# Patient Record
Sex: FEMALE | Race: WHITE | NOT HISPANIC OR LATINO | Employment: OTHER | URBAN - METROPOLITAN AREA
[De-identification: names, ages, dates, MRNs, and addresses within clinical notes are randomized per-mention and may not be internally consistent; named-entity substitution may affect disease eponyms.]

---

## 2021-03-17 ENCOUNTER — APPOINTMENT (OUTPATIENT)
Dept: WOMENS IMAGING | Facility: HOSPITAL | Age: 71
End: 2021-03-17

## 2021-03-17 PROCEDURE — 77067 SCR MAMMO BI INCL CAD: CPT | Performed by: RADIOLOGY

## 2022-03-29 ENCOUNTER — TRANSCRIBE ORDERS (OUTPATIENT)
Dept: MAMMOGRAPHY | Facility: CLINIC | Age: 72
End: 2022-03-29

## 2022-03-29 DIAGNOSIS — Z12.31 ENCOUNTER FOR SCREENING MAMMOGRAM FOR MALIGNANT NEOPLASM OF BREAST: ICD-10-CM

## 2022-03-29 DIAGNOSIS — M85.89 DISAPPEARING BONE DISEASE: Primary | ICD-10-CM

## 2022-03-29 DIAGNOSIS — Z13.820 SCREENING FOR OSTEOPOROSIS: ICD-10-CM

## 2022-04-13 ENCOUNTER — APPOINTMENT (OUTPATIENT)
Dept: WOMENS IMAGING | Facility: HOSPITAL | Age: 72
End: 2022-04-13

## 2022-04-13 PROCEDURE — 77067 SCR MAMMO BI INCL CAD: CPT | Performed by: RADIOLOGY

## 2022-04-14 ENCOUNTER — OFFICE VISIT (OUTPATIENT)
Dept: FAMILY MEDICINE CLINIC | Facility: CLINIC | Age: 72
End: 2022-04-14

## 2022-04-14 VITALS — BODY MASS INDEX: 44.16 KG/M2 | WEIGHT: 240 LBS | HEIGHT: 62 IN

## 2022-04-14 DIAGNOSIS — Z79.4 TYPE 2 DIABETES MELLITUS WITH STAGE 3A CHRONIC KIDNEY DISEASE, WITH LONG-TERM CURRENT USE OF INSULIN: ICD-10-CM

## 2022-04-14 DIAGNOSIS — Z85.3 HISTORY OF BREAST CANCER: ICD-10-CM

## 2022-04-14 DIAGNOSIS — N18.30 CKD STAGE 3 DUE TO TYPE 2 DIABETES MELLITUS: Primary | ICD-10-CM

## 2022-04-14 DIAGNOSIS — E11.22 TYPE 2 DIABETES MELLITUS WITH STAGE 3A CHRONIC KIDNEY DISEASE, WITH LONG-TERM CURRENT USE OF INSULIN: ICD-10-CM

## 2022-04-14 DIAGNOSIS — M1A.9XX0 CHRONIC GOUT WITHOUT TOPHUS, UNSPECIFIED CAUSE, UNSPECIFIED SITE: ICD-10-CM

## 2022-04-14 DIAGNOSIS — N18.31 TYPE 2 DIABETES MELLITUS WITH STAGE 3A CHRONIC KIDNEY DISEASE, WITH LONG-TERM CURRENT USE OF INSULIN: ICD-10-CM

## 2022-04-14 DIAGNOSIS — E11.22 CKD STAGE 3 DUE TO TYPE 2 DIABETES MELLITUS: Primary | ICD-10-CM

## 2022-04-14 DIAGNOSIS — I10 ESSENTIAL HYPERTENSION: ICD-10-CM

## 2022-04-14 PROBLEM — E66.01 MORBID OBESITY: Status: ACTIVE | Noted: 2022-04-14

## 2022-04-14 PROCEDURE — 36415 COLL VENOUS BLD VENIPUNCTURE: CPT | Performed by: STUDENT IN AN ORGANIZED HEALTH CARE EDUCATION/TRAINING PROGRAM

## 2022-04-14 PROCEDURE — 99214 OFFICE O/P EST MOD 30 MIN: CPT | Performed by: STUDENT IN AN ORGANIZED HEALTH CARE EDUCATION/TRAINING PROGRAM

## 2022-04-14 RX ORDER — ERGOCALCIFEROL 1.25 MG/1
50000 CAPSULE ORAL WEEKLY
Qty: 12 CAPSULE | Refills: 0 | Status: SHIPPED | OUTPATIENT
Start: 2022-04-14 | End: 2022-06-27

## 2022-04-14 RX ORDER — POTASSIUM CHLORIDE 750 MG/1
TABLET, EXTENDED RELEASE ORAL
COMMUNITY
Start: 2022-04-04 | End: 2022-12-12

## 2022-04-14 RX ORDER — FLUOXETINE HYDROCHLORIDE 40 MG/1
1 CAPSULE ORAL EVERY MORNING
COMMUNITY
Start: 2021-10-13 | End: 2022-04-14 | Stop reason: SDUPTHER

## 2022-04-14 RX ORDER — ATENOLOL 100 MG/1
100 TABLET ORAL DAILY
COMMUNITY
End: 2022-04-14 | Stop reason: SDUPTHER

## 2022-04-14 RX ORDER — ALLOPURINOL 100 MG/1
TABLET ORAL
COMMUNITY
Start: 2022-04-04 | End: 2022-07-22

## 2022-04-14 RX ORDER — ATORVASTATIN CALCIUM 80 MG/1
80 TABLET, FILM COATED ORAL DAILY
COMMUNITY
End: 2022-06-27

## 2022-04-14 RX ORDER — ATENOLOL 100 MG/1
100 TABLET ORAL DAILY
Qty: 90 TABLET | Refills: 1 | Status: SHIPPED | OUTPATIENT
Start: 2022-04-14 | End: 2022-08-04

## 2022-04-14 RX ORDER — FLUOXETINE HYDROCHLORIDE 40 MG/1
40 CAPSULE ORAL EVERY MORNING
Qty: 90 CAPSULE | Refills: 1 | Status: SHIPPED | OUTPATIENT
Start: 2022-04-14 | End: 2022-10-20 | Stop reason: SDUPTHER

## 2022-04-14 RX ORDER — ZOLPIDEM TARTRATE 10 MG/1
TABLET ORAL
COMMUNITY
Start: 2022-04-04 | End: 2022-07-14

## 2022-04-14 RX ORDER — TORSEMIDE 20 MG/1
TABLET ORAL
COMMUNITY
Start: 2022-04-04 | End: 2022-07-22

## 2022-04-14 NOTE — PROGRESS NOTES
"Chief Complaint  Med Refill    Subjective          Jyoti Gray presents to [facility]  History of Present Illness    Hypertension: She does not check her blood pressure. She has some headaches, but no chest pain, SOA, She is also taking torsemide for her blood pressure and for swelling. She is unsure of if she has had ECHO associated with this.     Major depression: stable on Fluoxetine and she is not having trouble with this medications.  Denies any SI, HI, thoughts of self-harm.  She states that her mood is doing okay most days.  She needs a refill of the fluoxetine    Type 2 Diabetes: She is not on any oral medciations. She has been on insulin 70/30 for several years, but does not check her blood glucose every day and states that she judges her glucose based on \"how im feeling\". She does not think she is having any low blood glucoses. She is wanting to see if we are able to get a long actin glucose monitor for her like a dexcom or abigail.     Insomnia: She only takes the ambien as needed.  She does not need a refill at this time.    Vitamin D deficiency: She has blood work ordered from her nephrologist, and is needing a refill vitamin D at this time.  He is tolerating this medication without any side effects.    Objective   Vital Signs:   Ht 157.5 cm (62\")   Wt 109 kg (240 lb)   BMI 43.90 kg/m²     Body mass index is 43.9 kg/m².    Review of Systems    Past History:  Medical History: has a past medical history of Anxiety, Breast cancer ([facility]), Chronic gout without tophus, Chronic kidney disease, stage 4 (severe) ([facility]), Cobalamin deficiency, Essential hypertension, Fibromyalgia, GERD without esophagitis, Gout, High risk medication use, History of malignant neoplasm of breast, Hyperglycemia due to type 1 diabetes mellitus (HCC), Kidney failure, Lipoatrophic diabetes (HCC), Mixed hyperlipidemia, Obesity, Primary insomnia, Severe major depression (HCC), Severe obesity (HCC), Type " 1 diabetes mellitus (HCC), and Vitamin D deficiency.   Surgical History: has a past surgical history that includes Colonoscopy (03/2016) and Breast lumpectomy (Right, 2006).   Family History: family history includes Diabetes in her father and mother; Hyperlipidemia in her mother; Hypertension in her mother; Lung cancer in her brother and father; Lung cancer (age of onset: 86) in her mother; Obesity in her mother.   Social History: reports that she has never smoked. She has never used smokeless tobacco. Drug use questions deferred to the physician. She reports that she does not drink alcohol.      Current Outpatient Medications:   •  allopurinol (ZYLOPRIM) 100 MG tablet, , Disp: , Rfl:   •  atenolol (TENORMIN) 100 MG tablet, Take 1 tablet by mouth Daily., Disp: 90 tablet, Rfl: 1  •  atorvastatin (LIPITOR) 80 MG tablet, Take 80 mg by mouth Daily., Disp: , Rfl:   •  FLUoxetine (PROzac) 40 MG capsule, Take 1 capsule by mouth Every Morning., Disp: 90 capsule, Rfl: 1  •  insulin NPH-insulin regular (humuLIN 70/30,novoLIN 70/30) (70-30) 100 UNIT/ML injection, Inject 100 Units under the skin into the appropriate area as directed 2 (Two) Times a Day With Meals. 45 units in the morning 25 units in the evening., Disp: , Rfl:   •  KLOR-CON 10 MEQ CR tablet, , Disp: , Rfl:   •  torsemide (DEMADEX) 20 MG tablet, , Disp: , Rfl:   •  zolpidem (AMBIEN) 10 MG tablet, , Disp: , Rfl:   •  vitamin D (ERGOCALCIFEROL) 1.25 MG (36026 UT) capsule capsule, Take 1 capsule by mouth 1 (One) Time Per Week., Disp: 12 capsule, Rfl: 0    Allergies: Patient has no known allergies.    Physical Exam  Constitutional:       General: She is not in acute distress.     Appearance: Normal appearance. She is obese. She is not toxic-appearing.   HENT:      Head: Normocephalic and atraumatic.   Cardiovascular:      Rate and Rhythm: Normal rate and regular rhythm.      Heart sounds: No murmur heard.    No gallop.   Pulmonary:      Effort: Pulmonary effort is  normal.      Comments: Mildly decreased breath sounds bilaterally.  No wheezing, rales or rhonchi  Abdominal:      General: There is no distension.      Palpations: Abdomen is soft.      Tenderness: There is no abdominal tenderness.   Neurological:      General: No focal deficit present.      Mental Status: She is alert and oriented to person, place, and time.   Psychiatric:      Comments: Flat mood, normal affect          Result Review :                   Assessment and Plan    Diagnoses and all orders for this visit:    1. CKD stage 3 due to type 2 diabetes mellitus (HCC) (Primary)  Assessment & Plan:  He sees nephrology and has blood work from them which we put in here and reviewed prior to sending to nephrology.  She does not manage her diabetes to the best extent after discussion with the patient.  Plan to send to endocrinology for further management.  Advised her to start taking her blood sugar every day twice a day because we will not be able to get a Dexcom set up for her unless she is having labile blood sugars, and since she is taking insulin it is good for her to check her blood glucose at least twice a day prior to giving herself her insulin.     Orders:  -     Ambulatory Referral to Endocrinology  -     CBC & Differential; Future  -     Comprehensive Metabolic Panel; Future  -     Hemoglobin A1c; Future  -     Lipid Panel; Future  -     Uric acid; Future  -     PTH, Intact; Future  -     Microalbumin / Creatinine Urine Ratio - Urine, Clean Catch; Future  -     Vitamin D 25 hydroxy; Future  -     Urinalysis With Microscopic If Indicated (No Culture) - Urine, Clean Catch; Future  -     CBC & Differential  -     Comprehensive Metabolic Panel  -     Hemoglobin A1c  -     Lipid Panel  -     Uric acid  -     PTH, Intact  -     Vitamin D 25 hydroxy    2. History of breast cancer  -     Ambulatory Referral to Endocrinology  -     CBC & Differential; Future  -     Comprehensive Metabolic Panel; Future  -      Hemoglobin A1c; Future  -     Lipid Panel; Future  -     Uric acid; Future  -     PTH, Intact; Future  -     Microalbumin / Creatinine Urine Ratio - Urine, Clean Catch; Future  -     Vitamin D 25 hydroxy; Future  -     Urinalysis With Microscopic If Indicated (No Culture) - Urine, Clean Catch; Future  -     CBC & Differential  -     Comprehensive Metabolic Panel  -     Hemoglobin A1c  -     Lipid Panel  -     Uric acid  -     PTH, Intact  -     Vitamin D 25 hydroxy    3. Type 2 diabetes mellitus with stage 3a chronic kidney disease, with long-term current use of insulin ([facility])  -     Ambulatory Referral to Endocrinology  -     CBC & Differential; Future  -     Comprehensive Metabolic Panel; Future  -     Hemoglobin A1c; Future  -     Lipid Panel; Future  -     Uric acid; Future  -     PTH, Intact; Future  -     Microalbumin / Creatinine Urine Ratio - Urine, Clean Catch; Future  -     Vitamin D 25 hydroxy; Future  -     Urinalysis With Microscopic If Indicated (No Culture) - Urine, Clean Catch; Future  -     CBC & Differential  -     Comprehensive Metabolic Panel  -     Hemoglobin A1c  -     Lipid Panel  -     Uric acid  -     PTH, Intact  -     Vitamin D 25 hydroxy    4. Essential hypertension  Assessment & Plan:  Stable at this time.  Tolerating atenolol, but needs a refill at this time    Orders:  -     Ambulatory Referral to Endocrinology  -     CBC & Differential; Future  -     Comprehensive Metabolic Panel; Future  -     Hemoglobin A1c; Future  -     Lipid Panel; Future  -     Uric acid; Future  -     PTH, Intact; Future  -     Microalbumin / Creatinine Urine Ratio - Urine, Clean Catch; Future  -     Vitamin D 25 hydroxy; Future  -     Urinalysis With Microscopic If Indicated (No Culture) - Urine, Clean Catch; Future  -     CBC & Differential  -     Comprehensive Metabolic Panel  -     Hemoglobin A1c  -     Lipid Panel  -     Uric acid  -     PTH, Intact  -     Vitamin D 25 hydroxy    5. Chronic gout without  tophus, unspecified cause, unspecified site  Assessment & Plan:  Blood work ordered today.  She is currently taking allopurinol and denies any gout flares recently.    Orders:  -     Ambulatory Referral to Endocrinology  -     CBC & Differential; Future  -     Comprehensive Metabolic Panel; Future  -     Hemoglobin A1c; Future  -     Lipid Panel; Future  -     Uric acid; Future  -     PTH, Intact; Future  -     Microalbumin / Creatinine Urine Ratio - Urine, Clean Catch; Future  -     Vitamin D 25 hydroxy; Future  -     Urinalysis With Microscopic If Indicated (No Culture) - Urine, Clean Catch; Future  -     CBC & Differential  -     Comprehensive Metabolic Panel  -     Hemoglobin A1c  -     Lipid Panel  -     Uric acid  -     PTH, Intact  -     Vitamin D 25 hydroxy    Other orders  -     atenolol (TENORMIN) 100 MG tablet; Take 1 tablet by mouth Daily.  Dispense: 90 tablet; Refill: 1  -     FLUoxetine (PROzac) 40 MG capsule; Take 1 capsule by mouth Every Morning.  Dispense: 90 capsule; Refill: 1  -     vitamin D (ERGOCALCIFEROL) 1.25 MG (80472 UT) capsule capsule; Take 1 capsule by mouth 1 (One) Time Per Week.  Dispense: 12 capsule; Refill: 0      Follow Up   Return in about 3 months (around 7/14/2022).  Patient was given instructions and counseling regarding her condition or for health maintenance advice. Please see specific information pulled into the AVS if appropriate.     Isa Anand, DO

## 2022-04-14 NOTE — ASSESSMENT & PLAN NOTE
He sees nephrology and has blood work from them which we put in here and reviewed prior to sending to nephrology.  She does not manage her diabetes to the best extent after discussion with the patient.  Plan to send to endocrinology for further management.  Advised her to start taking her blood sugar every day twice a day because we will not be able to get a Dexcom set up for her unless she is having labile blood sugars, and since she is taking insulin it is good for her to check her blood glucose at least twice a day prior to giving herself her insulin.

## 2022-04-14 NOTE — ASSESSMENT & PLAN NOTE
Blood work ordered today.  She is currently taking allopurinol and denies any gout flares recently.

## 2022-04-15 LAB
25(OH)D3+25(OH)D2 SERPL-MCNC: 35.7 NG/ML (ref 30–100)
ALBUMIN SERPL-MCNC: 4.2 G/DL (ref 3.7–4.7)
ALBUMIN/GLOB SERPL: 1.4 {RATIO} (ref 1.2–2.2)
ALP SERPL-CCNC: 119 IU/L (ref 44–121)
ALT SERPL-CCNC: 9 IU/L (ref 0–32)
AST SERPL-CCNC: 17 IU/L (ref 0–40)
BASOPHILS # BLD AUTO: 0.1 X10E3/UL (ref 0–0.2)
BASOPHILS NFR BLD AUTO: 1 %
BILIRUB SERPL-MCNC: 1.5 MG/DL (ref 0–1.2)
BUN SERPL-MCNC: 22 MG/DL (ref 8–27)
BUN/CREAT SERPL: 15 (ref 12–28)
CALCIUM SERPL-MCNC: 9.9 MG/DL (ref 8.7–10.3)
CHLORIDE SERPL-SCNC: 101 MMOL/L (ref 96–106)
CHOLEST SERPL-MCNC: 124 MG/DL (ref 100–199)
CO2 SERPL-SCNC: 19 MMOL/L (ref 20–29)
CREAT SERPL-MCNC: 1.44 MG/DL (ref 0.57–1)
EGFRCR SERPLBLD CKD-EPI 2021: 39 ML/MIN/1.73
EOSINOPHIL # BLD AUTO: 0.4 X10E3/UL (ref 0–0.4)
EOSINOPHIL NFR BLD AUTO: 5 %
ERYTHROCYTE [DISTWIDTH] IN BLOOD BY AUTOMATED COUNT: 13.1 % (ref 11.7–15.4)
GLOBULIN SER CALC-MCNC: 3 G/DL (ref 1.5–4.5)
GLUCOSE SERPL-MCNC: 379 MG/DL (ref 65–99)
HBA1C MFR BLD: 10.5 % (ref 4.8–5.6)
HCT VFR BLD AUTO: 40 % (ref 34–46.6)
HDLC SERPL-MCNC: 51 MG/DL
HGB BLD-MCNC: 12.7 G/DL (ref 11.1–15.9)
IMM GRANULOCYTES # BLD AUTO: 0 X10E3/UL (ref 0–0.1)
IMM GRANULOCYTES NFR BLD AUTO: 0 %
LDLC SERPL CALC-MCNC: 50 MG/DL (ref 0–99)
LYMPHOCYTES # BLD AUTO: 2.2 X10E3/UL (ref 0.7–3.1)
LYMPHOCYTES NFR BLD AUTO: 26 %
MCH RBC QN AUTO: 31.1 PG (ref 26.6–33)
MCHC RBC AUTO-ENTMCNC: 31.8 G/DL (ref 31.5–35.7)
MCV RBC AUTO: 98 FL (ref 79–97)
MONOCYTES # BLD AUTO: 0.6 X10E3/UL (ref 0.1–0.9)
MONOCYTES NFR BLD AUTO: 7 %
NEUTROPHILS # BLD AUTO: 5.2 X10E3/UL (ref 1.4–7)
NEUTROPHILS NFR BLD AUTO: 61 %
PLATELET # BLD AUTO: 226 X10E3/UL (ref 150–450)
POTASSIUM SERPL-SCNC: 4.8 MMOL/L (ref 3.5–5.2)
PROT SERPL-MCNC: 7.2 G/DL (ref 6–8.5)
PTH-INTACT SERPL-MCNC: 32 PG/ML (ref 15–65)
RBC # BLD AUTO: 4.09 X10E6/UL (ref 3.77–5.28)
SODIUM SERPL-SCNC: 138 MMOL/L (ref 134–144)
TRIGL SERPL-MCNC: 131 MG/DL (ref 0–149)
URATE SERPL-MCNC: 5.7 MG/DL (ref 3.1–7.9)
VLDLC SERPL CALC-MCNC: 23 MG/DL (ref 5–40)
WBC # BLD AUTO: 8.6 X10E3/UL (ref 3.4–10.8)

## 2022-06-27 ENCOUNTER — OFFICE VISIT (OUTPATIENT)
Dept: ENDOCRINOLOGY | Facility: CLINIC | Age: 72
End: 2022-06-27

## 2022-06-27 VITALS
WEIGHT: 232 LBS | HEART RATE: 70 BPM | OXYGEN SATURATION: 95 % | DIASTOLIC BLOOD PRESSURE: 64 MMHG | HEIGHT: 62 IN | BODY MASS INDEX: 42.69 KG/M2 | SYSTOLIC BLOOD PRESSURE: 128 MMHG

## 2022-06-27 DIAGNOSIS — E11.65 TYPE 2 DIABETES MELLITUS WITH HYPERGLYCEMIA, UNSPECIFIED WHETHER LONG TERM INSULIN USE: Primary | ICD-10-CM

## 2022-06-27 LAB
EXPIRATION DATE: ABNORMAL
GLUCOSE BLDC GLUCOMTR-MCNC: 166 MG/DL (ref 70–130)
Lab: ABNORMAL

## 2022-06-27 PROCEDURE — 99204 OFFICE O/P NEW MOD 45 MIN: CPT | Performed by: INTERNAL MEDICINE

## 2022-06-27 PROCEDURE — 82947 ASSAY GLUCOSE BLOOD QUANT: CPT | Performed by: INTERNAL MEDICINE

## 2022-06-27 RX ORDER — PROCHLORPERAZINE 25 MG/1
1 SUPPOSITORY RECTAL CONTINUOUS
Qty: 1 EACH | Refills: 0 | Status: SHIPPED | OUTPATIENT
Start: 2022-06-27

## 2022-06-27 RX ORDER — PROCHLORPERAZINE 25 MG/1
SUPPOSITORY RECTAL
Qty: 3 EACH | Refills: 11 | Status: SHIPPED | OUTPATIENT
Start: 2022-06-27

## 2022-06-27 RX ORDER — PROCHLORPERAZINE 25 MG/1
1 SUPPOSITORY RECTAL CONTINUOUS
Qty: 1 EACH | Refills: 0 | Status: CANCELLED | OUTPATIENT
Start: 2022-06-27

## 2022-06-27 RX ORDER — PROCHLORPERAZINE 25 MG/1
1 SUPPOSITORY RECTAL
Qty: 1 EACH | Refills: 3 | Status: SHIPPED | OUTPATIENT
Start: 2022-06-27

## 2022-06-27 RX ORDER — PRAVASTATIN SODIUM 80 MG/1
80 TABLET ORAL DAILY
COMMUNITY
End: 2022-12-12

## 2022-06-27 RX ORDER — PROCHLORPERAZINE 25 MG/1
1 SUPPOSITORY RECTAL
Qty: 1 EACH | Refills: 3 | Status: CANCELLED | OUTPATIENT
Start: 2022-06-27

## 2022-06-27 RX ORDER — LOSARTAN POTASSIUM 25 MG/1
12.5 TABLET ORAL DAILY
COMMUNITY

## 2022-06-27 NOTE — PROGRESS NOTES
Chief Complaint   Patient presents with   • Diabetes        New patient who is being seen in consultation regarding uncontrolled diabetes at the request of Isa Anand DO HPI   Jyoti Gray is a 71 y.o. female who presents for evaluation of diabetes    Patient has a history of type 2 diabetes.  This was initially diagnosed 30 or more years ago. She reports she has been on insulin for approximately 10 years. Patient is currently prescribed 45 units in the morning and 25 units at night.  Recommended doses were last changed more than a year ago.  However, patient is not taking medication regularly.  She reports that she frequently holds morning dose if glucose less than 160, she estimates she is only taking this once in the past week.  She does routinely take evening dose as evening glucose tends to be much higher.  Of note, she is also taking insulin postmeal.      Patient reports hypoglycemia awareness with symptoms of shaking and sweating.  She reports having glucose values in the 40s last night after she took both doses of insulin yesterday  Patient monitors blood glucose 1-3 times daily.  Patient presents with handwritten log of values ranging  in the past 2 weeks.  Glucose values generally best in the morning, highest at bedtime.  Patient does not follow a diabetic diet.  Patient does not exercise regularly.     History of dyslipidemia: Yes  History of renal dysfunction: Yes  History of Hypertension: Yes      Past Medical History:   Diagnosis Date   • Anxiety    • Breast cancer (HCC)    • Chronic gout without tophus    • Chronic kidney disease, stage 4 (severe) (HCC)    • Cobalamin deficiency    • Essential hypertension    • Fibromyalgia    • GERD without esophagitis    • Gout    • High risk medication use    • History of malignant neoplasm of breast    • Hyperglycemia due to type 1 diabetes mellitus (HCC)    • Kidney failure    • Lipoatrophic diabetes (HCC)    • Mixed hyperlipidemia    •  Obesity    • Primary insomnia    • Severe major depression (HCC)    • Severe obesity (HCC)    • Type 1 diabetes mellitus (HCC)    • Vitamin D deficiency      Past Surgical History:   Procedure Laterality Date   • ANKLE SURGERY Right    • ARTERIOVENOUS FISTULA     • BREAST LUMPECTOMY Right 2006    Malignant   • COLONOSCOPY  03/2016    NORMAL- HP POLYPS - REPEAT 10 YEARS   • COLONOSCOPY     • NECK SURGERY      to remove port   • SKIN CANCER EXCISION      arm and leg   • TUBAL ABDOMINAL LIGATION        Family History   Problem Relation Age of Onset   • Lung cancer Mother 86   • Diabetes Mother    • Hyperlipidemia Mother    • Hypertension Mother    • Obesity Mother    • Heart disease Mother    • Heart attack Mother    • Diabetes Father    • Lung cancer Father    • Stroke Sister    • No Known Problems Sister    • No Known Problems Sister    • No Known Problems Sister    • Cancer Brother    • Lung cancer Brother    • No Known Problems Son       Social History     Socioeconomic History   • Marital status:    Tobacco Use   • Smoking status: Never Smoker   • Smokeless tobacco: Never Used   Vaping Use   • Vaping Use: Never used   Substance and Sexual Activity   • Alcohol use: Never   • Drug use: Defer   • Sexual activity: Defer      No Known Allergies   Current Outpatient Medications on File Prior to Visit   Medication Sig Dispense Refill   • allopurinol (ZYLOPRIM) 100 MG tablet      • atenolol (TENORMIN) 100 MG tablet Take 1 tablet by mouth Daily. 90 tablet 1   • FLUoxetine (PROzac) 40 MG capsule Take 1 capsule by mouth Every Morning. 90 capsule 1   • KLOR-CON 10 MEQ CR tablet      • losartan (COZAAR) 25 MG tablet Take 25 mg by mouth Daily.     • pravastatin (PRAVACHOL) 80 MG tablet Take 80 mg by mouth Daily.     • torsemide (DEMADEX) 20 MG tablet      • zolpidem (AMBIEN) 10 MG tablet      • [DISCONTINUED] insulin NPH-insulin regular (humuLIN 70/30,novoLIN 70/30) (70-30) 100 UNIT/ML injection Inject 100 Units under  "the skin into the appropriate area as directed 2 (Two) Times a Day With Meals. 45 units in the morning 25 units in the evening.     • [DISCONTINUED] atorvastatin (LIPITOR) 80 MG tablet Take 80 mg by mouth Daily.     • [DISCONTINUED] vitamin D (ERGOCALCIFEROL) 1.25 MG (07667 UT) capsule capsule Take 1 capsule by mouth 1 (One) Time Per Week. 12 capsule 0     No current facility-administered medications on file prior to visit.        Review of Systems   Constitutional: Positive for fatigue. Negative for unexpected weight loss.   HENT: Positive for ear pain, facial swelling and hearing loss.    Eyes: Positive for pain and itching.   Respiratory: Positive for shortness of breath. Negative for cough.    Cardiovascular: Positive for leg swelling. Negative for palpitations.   Gastrointestinal: Positive for diarrhea. Negative for constipation.   Endocrine: Negative for polydipsia and polyuria.   Genitourinary: Positive for decreased urine volume, difficulty urinating and urgency.   Musculoskeletal: Positive for arthralgias, back pain, gait problem, myalgias and neck pain.   Skin: Positive for rash. Negative for dry skin.   Neurological: Positive for dizziness, tremors, weakness, light-headedness and headache.   Psychiatric/Behavioral: Positive for sleep disturbance. The patient is nervous/anxious.         Vitals:    06/27/22 1417   BP: 128/64   BP Location: Left arm   Patient Position: Sitting   Cuff Size: Adult   Pulse: 70   SpO2: 95%   Weight: 105 kg (232 lb)   Height: 157.5 cm (62\")   Body mass index is 42.43 kg/m².     Physical Exam  Vitals reviewed.   Constitutional:       General: She is not in acute distress.     Appearance: She is obese.   HENT:      Head: Normocephalic and atraumatic.      Right Ear: Hearing normal.      Left Ear: Hearing normal.      Nose: Nose normal.   Eyes:      General: Lids are normal.      Conjunctiva/sclera: Conjunctivae normal.   Neck:      Thyroid: No thyromegaly or thyroid tenderness. "   Cardiovascular:      Rate and Rhythm: Normal rate and regular rhythm.      Heart sounds: No murmur heard.  Pulmonary:      Effort: Pulmonary effort is normal.      Breath sounds: Normal breath sounds.   Abdominal:      General: Abdomen is protuberant. Bowel sounds are normal.      Palpations: Abdomen is soft.   Lymphadenopathy:      Cervical: No cervical adenopathy.   Skin:     General: Skin is warm and dry.      Findings: No rash.   Neurological:      Mental Status: She is alert.   Psychiatric:         Mood and Affect: Mood and affect normal.        Labs/Imaging   Latest Reference Range & Units 04/14/22 11:31 06/27/22 14:43   Glucose 70 - 130 mg/dL 379 (H) 166 !   Sodium 134 - 144 mmol/L 138    Potassium 3.5 - 5.2 mmol/L 4.8    CO2 20 - 29 mmol/L 19 (L)    Chloride 96 - 106 mmol/L 101    Creatinine 0.57 - 1.00 mg/dL 1.44 (H)    BUN 8 - 27 mg/dL 22    BUN/Creatinine Ratio 12 - 28  15    Calcium 8.7 - 10.3 mg/dL 9.9    EGFR Result >59 mL/min/1.73 39 (L)    Alkaline Phosphatase 44 - 121 IU/L 119    Total Protein 6.0 - 8.5 g/dL 7.2    Uric Acid 3.1 - 7.9 mg/dL 5.7 [1]    ALT (SGPT) 0 - 32 IU/L 9    AST (SGOT) 0 - 40 IU/L 17    Total Bilirubin 0.0 - 1.2 mg/dL 1.5 (H)    Albumin 3.7 - 4.7 g/dL 4.2    A/G Ratio 1.2 - 2.2  1.4    Hemoglobin A1C 4.8 - 5.6 % 10.5 (H) [2]    PTH Intact (Serial Monitor) 15 - 65 pg/mL 32    Total Cholesterol 100 - 199 mg/dL 124    HDL Cholesterol >39 mg/dL 51    LDL Cholesterol  0 - 99 mg/dL 50    Triglycerides 0 - 149 mg/dL 131    VLDL Cholesterol [place] 5 - 40 mg/dL 23    (H): Data is abnormally high  !: Data is abnormal  (L): Data is abnormally low  [1]            Therapeutic target for gout patients: <6.0  [2]          Prediabetes: 5.7 - 6.4            Diabetes: >6.4            Glycemic control for adults with diabetes: <7.0    Assessment and Plan    Diagnoses and all orders for this visit:    1. Type 2 diabetes mellitus with hyperglycemia, unspecified whether long term insulin use (HCC)  (Primary)  -Uncontrolled with most recent hemoglobin A1c 10.5%, too soon to repeat today  -Patient has a wide amount of variation in glucose values with both hypoglycemia and hyperglycemia, likely due to help doses of medication.  Discussed with patient that it is difficult to make changes to her medication regimen if I do not know exactly what she is taking if she is not taking medication regularly.  Discussed appropriate use of 70/30, reviewed that this should be taken prior to meals and the importance of eating with each dose.  Discussed that help doses of medication will lead to hyperglycemia later in the day.  -Given hypoglycemia reported overnight when patient took both doses of insulin, will plan to reduce recommended total daily dose by approximately 20%  -Recommended patient take 70/30 35 units in the morning and 20 units with dinner.  Discussed that she may reduce dose by half if Premeal glucose less than 150, patient instructed to hold dose if Premeal glucose less than 80.  Patient instructed to keep log of glucose data and insulin administration and to bring this to future appointments for review.  -Patient was advised to monitor blood sugar 2 times daily.  Patient inquired about CGM use given limited mobility.  We will send Dexcom to pharmacy, discussed that insurance coverage is variable.  Patient was instructed to bring glucometer to all future appointments. Patient should contact the clinic between appointments with hypoglycemia or persistent hyperglycemia.  Discussed signs and symptoms of hypoglycemia as well as hypoglycemia management via the rule of 15's.  Discussed potential for long-term complications with uncontrolled diabetes including nephropathy, neuropathy, retinopathy, increased risk for cardiac disease.  Discussed the role of diet and exercise in the management of diabetes.  CMP, lipids up-to-date from April 2022, patient taking pravastatin  -     POC Glucose, Blood    Other orders  -      insulin NPH-insulin regular (humuLIN 70/30,novoLIN 70/30) (70-30) 100 UNIT/ML injection; 35 units in the morning 20 units in the evening.  Dispense: 60 mL; Refill: 1  -     Continuous Blood Gluc Transmit (Dexcom G6 Transmitter) misc; 1 each Every 3 (Three) Months.  Dispense: 1 each; Refill: 3  -     Continuous Blood Gluc  (Dexcom G6 ) device; 1 each Continuous.  Dispense: 1 each; Refill: 0  -     Continuous Blood Gluc Sensor (Dexcom G6 Sensor); Every 10 (Ten) Days.  Dispense: 3 each; Refill: 11      Return in about 6 weeks (around 8/8/2022) for Next scheduled follow up. The patient was instructed to contact the clinic with any interval questions or concerns.    Megan Baez MD     Dictated Utilizing Dragon Dictation

## 2022-07-14 ENCOUNTER — OFFICE VISIT (OUTPATIENT)
Dept: FAMILY MEDICINE CLINIC | Facility: CLINIC | Age: 72
End: 2022-07-14

## 2022-07-14 VITALS
BODY MASS INDEX: 42.14 KG/M2 | HEIGHT: 62 IN | SYSTOLIC BLOOD PRESSURE: 120 MMHG | WEIGHT: 229 LBS | DIASTOLIC BLOOD PRESSURE: 82 MMHG

## 2022-07-14 VITALS
WEIGHT: 229 LBS | DIASTOLIC BLOOD PRESSURE: 82 MMHG | HEIGHT: 62 IN | BODY MASS INDEX: 42.14 KG/M2 | SYSTOLIC BLOOD PRESSURE: 120 MMHG

## 2022-07-14 DIAGNOSIS — E78.2 MIXED HYPERLIPIDEMIA: ICD-10-CM

## 2022-07-14 DIAGNOSIS — Z23 IMMUNIZATION DUE: ICD-10-CM

## 2022-07-14 DIAGNOSIS — N18.31 TYPE 2 DIABETES MELLITUS WITH STAGE 3A CHRONIC KIDNEY DISEASE, WITH LONG-TERM CURRENT USE OF INSULIN: Primary | ICD-10-CM

## 2022-07-14 DIAGNOSIS — Z00.00 ENCOUNTER FOR SUBSEQUENT ANNUAL WELLNESS VISIT (AWV) IN MEDICARE PATIENT: ICD-10-CM

## 2022-07-14 DIAGNOSIS — E11.22 TYPE 2 DIABETES MELLITUS WITH STAGE 3A CHRONIC KIDNEY DISEASE, WITH LONG-TERM CURRENT USE OF INSULIN: Primary | ICD-10-CM

## 2022-07-14 DIAGNOSIS — N18.30 CKD STAGE 3 DUE TO TYPE 2 DIABETES MELLITUS: ICD-10-CM

## 2022-07-14 DIAGNOSIS — Z85.3 HISTORY OF BREAST CANCER: ICD-10-CM

## 2022-07-14 DIAGNOSIS — E11.22 CKD STAGE 3 DUE TO TYPE 2 DIABETES MELLITUS: ICD-10-CM

## 2022-07-14 DIAGNOSIS — M1A.00X0 IDIOPATHIC CHRONIC GOUT WITHOUT TOPHUS, UNSPECIFIED SITE: ICD-10-CM

## 2022-07-14 DIAGNOSIS — I10 ESSENTIAL HYPERTENSION: ICD-10-CM

## 2022-07-14 DIAGNOSIS — Z79.4 TYPE 2 DIABETES MELLITUS WITH STAGE 3A CHRONIC KIDNEY DISEASE, WITH LONG-TERM CURRENT USE OF INSULIN: Primary | ICD-10-CM

## 2022-07-14 DIAGNOSIS — Z78.9 PATIENT HAD NO FALLS IN PAST YEAR: ICD-10-CM

## 2022-07-14 DIAGNOSIS — K21.9 GASTROESOPHAGEAL REFLUX DISEASE WITHOUT ESOPHAGITIS: ICD-10-CM

## 2022-07-14 PROCEDURE — 90677 PCV20 VACCINE IM: CPT | Performed by: STUDENT IN AN ORGANIZED HEALTH CARE EDUCATION/TRAINING PROGRAM

## 2022-07-14 PROCEDURE — 1159F MED LIST DOCD IN RCRD: CPT | Performed by: STUDENT IN AN ORGANIZED HEALTH CARE EDUCATION/TRAINING PROGRAM

## 2022-07-14 PROCEDURE — 1170F FXNL STATUS ASSESSED: CPT | Performed by: STUDENT IN AN ORGANIZED HEALTH CARE EDUCATION/TRAINING PROGRAM

## 2022-07-14 PROCEDURE — G0009 ADMIN PNEUMOCOCCAL VACCINE: HCPCS | Performed by: STUDENT IN AN ORGANIZED HEALTH CARE EDUCATION/TRAINING PROGRAM

## 2022-07-14 PROCEDURE — G0439 PPPS, SUBSEQ VISIT: HCPCS | Performed by: STUDENT IN AN ORGANIZED HEALTH CARE EDUCATION/TRAINING PROGRAM

## 2022-07-14 RX ORDER — ATORVASTATIN CALCIUM 80 MG/1
80 TABLET, FILM COATED ORAL DAILY
COMMUNITY
End: 2022-12-12 | Stop reason: SDUPTHER

## 2022-07-14 NOTE — PROGRESS NOTES
QUICK REFERENCE INFORMATION:  The ABCs of the Annual Wellness Visit    Subsequent Medicare Wellness Visit      HEALTH RISK ASSESSMENT    1950    Recent Hospitalizations:  No hospitalization(s) within the last year..    Current Medical Providers:  Patient Care Team:  Cooper Aaron MD as PCP - General (Family Medicine)  Juan Manuel De Los Santos MD as Consulting Physician (Family Medicine)  Megan Baez MD as Consulting Physician (Endocrinology)    Smoking Status:  Social History     Tobacco Use   Smoking Status Never Smoker   Smokeless Tobacco Never Used       Alcohol Consumption:  Social History     Substance and Sexual Activity   Alcohol Use Never       Depression Screen:   PHQ-2/PHQ-9 Depression Screening 7/14/2022   Little Interest or Pleasure in Doing Things 0-->not at all   Feeling Down, Depressed or Hopeless 0-->not at all   PHQ-9: Brief Depression Severity Measure Score 0       Health Habits and Functional and Cognitive Screening:  Functional & Cognitive Status 7/14/2022   Do you have difficulty preparing food and eating? No   Do you have difficulty bathing yourself, getting dressed or grooming yourself? Yes   Do you have difficulty using the toilet? No   Do you have difficulty moving around from place to place? No   Do you have trouble with steps or getting out of a bed or a chair? No   Current Diet Diabetic Diet   Dental Exam Not up to date   Eye Exam Not up to date   Exercise (times per week) 0 times per week   Current Exercises Include No Regular Exercise   Do you need help using the phone?  No   Are you deaf or do you have serious difficulty hearing?  No   Do you need help with transportation? No   Do you need help shopping? Yes   Do you need help preparing meals?  No   Do you need help with housework?  No   Do you need help with laundry? No   Do you need help taking your medications? No   Do you need help managing money? No   Do you ever drive or ride in a car without wearing a seat belt? No   Have you  felt unusual stress, anger or loneliness in the last month? No   Who do you live with? Child   If you need help, do you have trouble finding someone available to you? No   Have you been bothered in the last four weeks by sexual problems? No   Do you have difficulty concentrating, remembering or making decisions? No       Fall Risk Screen:  AYUSH Fall Risk Assessment was completed, and patient is at LOW risk for falls.Assessment completed on:7/14/2022    ACE III MINI        Does the patient have evidence of cognitive impairment? No    No opioid medication identified on active medication list. I have reviewed chart for other potential  high risk medication/s and harmful drug interactions in the elderly.          Aspirin use counseling: Does not need ASA (and currently is not on it)    Recent Lab Results:  CMP:  Lab Results   Component Value Date    BUN 22 04/14/2022    CREATININE 1.44 (H) 04/14/2022    BCR 15 04/14/2022     04/14/2022    K 4.8 04/14/2022    CO2 19 (L) 04/14/2022    CALCIUM 9.9 04/14/2022    PROTENTOTREF 7.2 04/14/2022    ALBUMIN 4.2 04/14/2022    LABGLOBREF 3.0 04/14/2022    LABIL2 1.4 04/14/2022    BILITOT 1.5 (H) 04/14/2022    ALKPHOS 119 04/14/2022    AST 17 04/14/2022    ALT 9 04/14/2022     HbA1c:  Lab Results   Component Value Date    HGBA1C 10.5 (H) 04/14/2022     Microalbumin:  No results found for: MICROALBUR, POCMALB, POCCREAT  Lipid Panel  Lab Results   Component Value Date    TRIG 131 04/14/2022    HDL 51 04/14/2022    LDL 50 04/14/2022    AST 17 04/14/2022    ALT 9 04/14/2022       Visual Acuity:  No exam data present    Age-appropriate Screening Schedule:  Refer to the list below for future screening recommendations based on patient's age, sex and/or medical conditions. Orders for these recommended tests are listed in the plan section. The patient has been provided with a written plan.    Health Maintenance   Topic Date Due   • URINE MICROALBUMIN  Never done   • DXA SCAN  Never  done   • DIABETIC EYE EXAM  09/01/2022 (Originally 3/29/2022)   • TDAP/TD VACCINES (1 - Tdap) 11/29/2022 (Originally 9/17/1969)   • ZOSTER VACCINE (1 of 2) 07/14/2023 (Originally 9/17/2000)   • INFLUENZA VACCINE  10/01/2022   • HEMOGLOBIN A1C  10/14/2022   • LIPID PANEL  04/14/2023   • DIABETIC FOOT EXAM  07/14/2023   • MAMMOGRAM  04/13/2024        Diandra Gray is a 71 y.o. female who presents for a Subsequent Wellness Visit.    The following portions of the patient's history were reviewed and updated as appropriate: allergies, current medications, past family history, past medical history, past social history, past surgical history and problem list.    Outpatient Medications Prior to Visit   Medication Sig Dispense Refill   • allopurinol (ZYLOPRIM) 100 MG tablet      • atenolol (TENORMIN) 100 MG tablet Take 1 tablet by mouth Daily. 90 tablet 1   • atorvastatin (LIPITOR) 80 MG tablet Take 80 mg by mouth Daily.     • Continuous Blood Gluc  (Dexcom G6 ) device 1 each Continuous. 1 each 0   • Continuous Blood Gluc Sensor (Dexcom G6 Sensor) Every 10 (Ten) Days. 3 each 11   • Continuous Blood Gluc Transmit (Dexcom G6 Transmitter) misc 1 each Every 3 (Three) Months. 1 each 3   • FLUoxetine (PROzac) 40 MG capsule Take 1 capsule by mouth Every Morning. (Patient taking differently: Take 40 mg by mouth Every Other Day.) 90 capsule 1   • insulin NPH-insulin regular (humuLIN 70/30,novoLIN 70/30) (70-30) 100 UNIT/ML injection 35 units in the morning 20 units in the evening. 60 mL 1   • KLOR-CON 10 MEQ CR tablet      • losartan (COZAAR) 25 MG tablet Take 12.5 mg by mouth Daily.     • pravastatin (PRAVACHOL) 80 MG tablet Take 80 mg by mouth Daily.     • torsemide (DEMADEX) 20 MG tablet        No facility-administered medications prior to visit.       Patient Active Problem List   Diagnosis   • Morbid obesity (HCC)   • CKD stage 3 due to type 2 diabetes mellitus (HCC)   • History of breast cancer  "  • Type 2 diabetes mellitus with stage 3a chronic kidney disease, with long-term current use of insulin ([facility])   • Essential hypertension   • Chronic gout without tophus   • Encounter for subsequent annual wellness visit (AWV) in Medicare patient   • Patient had no falls in past year   • Gastroesophageal reflux disease without esophagitis   • Mixed hyperlipidemia   • Body mass index (BMI) of 40.0 to 44.9 in adult ([facility])     Physical Exam  Feet:      Right foot:      Protective Sensation: 10 sites tested. 7 sites sensed.      Skin integrity: Callus and dry skin present.      Toenail Condition: Right toenails are abnormally thick.      Comments: Diabetic foot exam:   Left: Filament test present   Pulses Dorsalis Pedis:  present   Vibratory sensation normal   Proprioception normal   Sharp/dull discrimination diminished                 Advance Care Planning:  ACP discussion was held with the patient during this visit. Patient does not have an advance directive, information provided.    Identification of Risk Factors:  Risk factors include: Advance Directive Discussion  Cardiovascular risk  Chronic Pain   Diabetic Lab Screening   Fall Risk  Glaucoma Risk  Immunizations Discussed/Encouraged (specific immunizations; Td, Tdap, Shingrix and COVID19 )  Inactivity/Sedentary  Obesity/Overweight   Osteoporosis Risk.    Compared to one year ago, the patient feels her physical health is the same.  Compared to one year ago, the patient feels her mental health is the same.    Objective       Vitals:    07/14/22 1546   BP: 120/82   BP Location: Left arm   Patient Position: Sitting   Weight: 104 kg (229 lb)   Height: 157.5 cm (62\")   PainSc: 0-No pain   PainLoc: Comment: shoulders     BMI Readings from Last 1 Encounters:   07/14/22 41.88 kg/m²   BMI is above normal parameters. Recommendations include: educational material, exercise counseling and nutrition counseling      Assessment & Plan   Problem List Items Addressed This Visit     "    Cardiac and Vasculature    Essential hypertension    Relevant Medications    torsemide (DEMADEX) 20 MG tablet    atenolol (TENORMIN) 100 MG tablet    losartan (COZAAR) 25 MG tablet    Mixed hyperlipidemia    Relevant Medications    pravastatin (PRAVACHOL) 80 MG tablet    atorvastatin (LIPITOR) 80 MG tablet       Endocrine and Metabolic    CKD stage 3 due to type 2 diabetes mellitus ([facility])    Relevant Medications    torsemide (DEMADEX) 20 MG tablet    insulin NPH-insulin regular (humuLIN 70/30,novoLIN 70/30) (70-30) 100 UNIT/ML injection    Body mass index (BMI) of 40.0 to 44.9 in adult ([facility])    Current Assessment & Plan     Nutrition and exercise education given. Patient advised to limit carb intake and increase activity level. Patient plans to set personal activity goals daily.               Gastrointestinal Abdominal     Gastroesophageal reflux disease without esophagitis       Hematology and Neoplasia    History of breast cancer       Musculoskeletal and Injuries    Chronic gout without tophus       Symptoms and Signs    Patient had no falls in past year       Other    Type 2 diabetes mellitus with stage 3a chronic kidney disease, with long-term current use of insulin ([facility]) - Primary    Current Assessment & Plan     Diabetic education given. Patient advised to check glucose twice daily, follow diabetic diet, and exercise daily. Patient educated on importance of daily foot care, annual eye exam, and following medication regimen. Diabetic foot exam completed.           Relevant Medications    torsemide (DEMADEX) 20 MG tablet    insulin NPH-insulin regular (humuLIN 70/30,novoLIN 70/30) (70-30) 100 UNIT/ML injection    Other Relevant Orders    Microalbumin / Creatinine Urine Ratio - Urine, Clean Catch    Encounter for subsequent annual wellness visit (AWV) in Medicare patient    Current Assessment & Plan     Updated annual wellness visit checklist.  Immunizations discussed. Patient to follow up with pharmacy about  Shingrix, Td, and Covid booster. Prevnar 20 given in clinic. Screening up-to-date.  Recommend yearly dental and eye exams. Also discussed monitoring of blood pressure and lipids. We addressed patient self-assessment of health status, frailty, and physical functioning. We reviewed psychosocial risks, behavioral risks, instrumental activities of daily living, and patient health risk assessment. Patient was given a personalized prevention plan.                Other Visit Diagnoses     Immunization due        Relevant Orders    Pneumococcal Conjugate Vaccine 20-Valent (PCV20) (Completed)        Patient Self-Management and Personalized Health Advice  The patient has been provided with information about: diet, exercise, prevention of cardiac or vascular disease, fall prevention and designing advance directives and preventive services including:   · Annual Wellness Visit (AWV)  · Depression Screening (15 minutes face to face, Code ).    Outpatient Encounter Medications as of 7/14/2022   Medication Sig Dispense Refill   • allopurinol (ZYLOPRIM) 100 MG tablet      • atenolol (TENORMIN) 100 MG tablet Take 1 tablet by mouth Daily. 90 tablet 1   • atorvastatin (LIPITOR) 80 MG tablet Take 80 mg by mouth Daily.     • Continuous Blood Gluc  (Dexcom G6 ) device 1 each Continuous. 1 each 0   • Continuous Blood Gluc Sensor (Dexcom G6 Sensor) Every 10 (Ten) Days. 3 each 11   • Continuous Blood Gluc Transmit (Dexcom G6 Transmitter) misc 1 each Every 3 (Three) Months. 1 each 3   • FLUoxetine (PROzac) 40 MG capsule Take 1 capsule by mouth Every Morning. (Patient taking differently: Take 40 mg by mouth Every Other Day.) 90 capsule 1   • insulin NPH-insulin regular (humuLIN 70/30,novoLIN 70/30) (70-30) 100 UNIT/ML injection 35 units in the morning 20 units in the evening. 60 mL 1   • KLOR-CON 10 MEQ CR tablet      • losartan (COZAAR) 25 MG tablet Take 12.5 mg by mouth Daily.     • pravastatin (PRAVACHOL) 80 MG tablet  Take 80 mg by mouth Daily.     • torsemide (DEMADEX) 20 MG tablet      • [DISCONTINUED] zolpidem (AMBIEN) 10 MG tablet        No facility-administered encounter medications on file as of 7/14/2022.       Follow Up:  Return in about 1 year (around 7/14/2023) for Medicare Wellness.     There are no Patient Instructions on file for this visit.    An After Visit Summary and PPPS with all of these plans were given to the patient.       I have reviewed and edited information documented by wellness nurse and documentation on diagnoses is my own.

## 2022-07-14 NOTE — ASSESSMENT & PLAN NOTE
Updated annual wellness visit checklist.  Immunizations discussed. Patient to follow up with pharmacy about Shingrix, Td, and Covid booster. Prevnar 20 given in clinic. Screening up-to-date.  Recommend yearly dental and eye exams. Also discussed monitoring of blood pressure and lipids. We addressed patient self-assessment of health status, frailty, and physical functioning. We reviewed psychosocial risks, behavioral risks, instrumental activities of daily living, and patient health risk assessment. Patient was given a personalized prevention plan.

## 2022-07-14 NOTE — TELEPHONE ENCOUNTER
Rx Refill Note  Requested Prescriptions     Pending Prescriptions Disp Refills   • torsemide (DEMADEX) 20 MG tablet [Pharmacy Med Name: TORSEMIDE TAB 20MG] 90 tablet 1     Sig: TAKE 1 TABLET DAILY   • allopurinol (ZYLOPRIM) 100 MG tablet [Pharmacy Med Name: ALLOPURINOL  TAB 100MG] 90 tablet 1     Sig: TAKE 1 TABLET DAILY      Last office visit with prescribing clinician: Visit date not found      Next office visit with prescribing clinician: Visit date not found            Pete Osman MA  07/14/22, 13:34 EDT

## 2022-07-14 NOTE — ASSESSMENT & PLAN NOTE
Diabetic education given. Patient advised to check glucose twice daily, follow diabetic diet, and exercise daily. Patient educated on importance of daily foot care, annual eye exam, and following medication regimen. Diabetic foot exam completed.

## 2022-07-14 NOTE — ASSESSMENT & PLAN NOTE
Nutrition and exercise education given. Patient advised to limit carb intake and increase activity level. Patient plans to set personal activity goals daily.

## 2022-07-15 LAB
ALBUMIN/CREAT UR: 6 MG/G CREAT (ref 0–29)
CREAT UR-MCNC: 320.4 MG/DL
MICROALBUMIN UR-MCNC: 18.8 UG/ML

## 2022-07-22 RX ORDER — TORSEMIDE 20 MG/1
TABLET ORAL
Qty: 90 TABLET | Refills: 1 | Status: SHIPPED | OUTPATIENT
Start: 2022-07-22 | End: 2022-12-12 | Stop reason: SDUPTHER

## 2022-07-22 RX ORDER — ALLOPURINOL 100 MG/1
TABLET ORAL
Qty: 90 TABLET | Refills: 1 | Status: SHIPPED | OUTPATIENT
Start: 2022-07-22 | End: 2023-02-17

## 2022-08-02 ENCOUNTER — TELEPHONE (OUTPATIENT)
Dept: FAMILY MEDICINE CLINIC | Facility: CLINIC | Age: 72
End: 2022-08-02

## 2022-08-02 NOTE — TELEPHONE ENCOUNTER
She would need an appointment to discuss this. I have not filled it before and she has not been in the office sine April. She needs to bring in the medication bottle as well so I can see the previous instructions.

## 2022-08-02 NOTE — TELEPHONE ENCOUNTER
Incoming Refill Request      Medication requested (name and dose): ATORVASTATIN CALCIUM TAB 80 MG GOES TO [facility]  ZOLPIDEM TARTRATE 10MG TAB GOES TO [facility] IN [place]    Pharmacy where request should be sent: SEE ABOVE ADDED NOTED    Additional details provided by patient: THESE ARE TO GO TO DIFFERENT PHARMACIES    Best call back number: [phone]    Does the patient have less than a 3 day supply:  [x] Yes  [] No    Miguel A Thacker Rep  08/02/22, 10:21 EDT

## 2022-08-02 NOTE — TELEPHONE ENCOUNTER
You saw patient recently but ambien is not on her med list. Last rx was 12/7/21 #30 with 2 refills.

## 2022-08-04 RX ORDER — ATENOLOL 100 MG/1
TABLET ORAL
Qty: 90 TABLET | Refills: 1 | Status: SHIPPED | OUTPATIENT
Start: 2022-08-04

## 2022-08-29 ENCOUNTER — CLINICAL SUPPORT (OUTPATIENT)
Dept: FAMILY MEDICINE CLINIC | Facility: CLINIC | Age: 72
End: 2022-08-29

## 2022-08-29 ENCOUNTER — TELEPHONE (OUTPATIENT)
Dept: FAMILY MEDICINE CLINIC | Facility: CLINIC | Age: 72
End: 2022-08-29

## 2022-08-29 ENCOUNTER — LAB (OUTPATIENT)
Dept: FAMILY MEDICINE CLINIC | Facility: CLINIC | Age: 72
End: 2022-08-29

## 2022-08-29 DIAGNOSIS — E66.9 OBESITY, UNSPECIFIED CLASSIFICATION, UNSPECIFIED OBESITY TYPE, UNSPECIFIED WHETHER SERIOUS COMORBIDITY PRESENT: ICD-10-CM

## 2022-08-29 DIAGNOSIS — I10 ESSENTIAL HYPERTENSION, BENIGN: ICD-10-CM

## 2022-08-29 DIAGNOSIS — N18.30 STAGE 3 CHRONIC KIDNEY DISEASE, UNSPECIFIED WHETHER STAGE 3A OR 3B CKD: Primary | ICD-10-CM

## 2022-08-29 DIAGNOSIS — M10.9 GOUT, UNSPECIFIED CAUSE, UNSPECIFIED CHRONICITY, UNSPECIFIED SITE: ICD-10-CM

## 2022-08-29 DIAGNOSIS — E11.29 TYPE 2 DIABETES MELLITUS WITH OTHER KIDNEY COMPLICATION, UNSPECIFIED WHETHER LONG TERM INSULIN USE: ICD-10-CM

## 2022-08-29 NOTE — TELEPHONE ENCOUNTER
Incoming Refill Request      Medication requested (name and dose):   ZOLPIDEM TARTRATE 10 MG    Pharmacy where request should be sent: [facility]    Additional details provided by patient: PT STOPPED IN TO GET LABS DRAWN AND REQUESTED REFILL WHILE IN OFFICE    Best call back number: [phone]    Does the patient have less than a 3 day supply:  [x] Yes  [] No    Miguel A GARCIA Rep  08/29/22, 11:06 EDT

## 2022-08-30 LAB
25(OH)D3+25(OH)D2 SERPL-MCNC: 35.5 NG/ML (ref 30–100)
ALBUMIN SERPL-MCNC: 4 G/DL (ref 3.7–4.7)
ALBUMIN/GLOB SERPL: 1.2 {RATIO} (ref 1.2–2.2)
ALP SERPL-CCNC: 109 IU/L (ref 44–121)
ALT SERPL-CCNC: 8 IU/L (ref 0–32)
APPEARANCE UR: CLEAR
AST SERPL-CCNC: 15 IU/L (ref 0–40)
BACTERIA #/AREA URNS HPF: NORMAL /[HPF]
BILIRUB SERPL-MCNC: 0.7 MG/DL (ref 0–1.2)
BILIRUB UR QL STRIP: NEGATIVE
BUN SERPL-MCNC: 18 MG/DL (ref 8–27)
BUN/CREAT SERPL: 13 (ref 12–28)
CALCIUM SERPL-MCNC: 9.9 MG/DL (ref 8.7–10.3)
CASTS URNS QL MICRO: NORMAL /LPF
CHLORIDE SERPL-SCNC: 100 MMOL/L (ref 96–106)
CO2 SERPL-SCNC: 19 MMOL/L (ref 20–29)
COLOR UR: YELLOW
CREAT SERPL-MCNC: 1.39 MG/DL (ref 0.57–1)
EGFRCR-CYS SERPLBLD CKD-EPI 2021: 41 ML/MIN/1.73
EPI CELLS #/AREA URNS HPF: NORMAL /HPF (ref 0–10)
ERYTHROCYTE [DISTWIDTH] IN BLOOD BY AUTOMATED COUNT: 14.7 % (ref 11.7–15.4)
GLOBULIN SER CALC-MCNC: 3.3 G/DL (ref 1.5–4.5)
GLUCOSE SERPL-MCNC: 190 MG/DL (ref 65–99)
GLUCOSE UR QL STRIP: NEGATIVE
HBA1C MFR BLD: 8.1 % (ref 4.8–5.6)
HCT VFR BLD AUTO: 38 % (ref 34–46.6)
HGB BLD-MCNC: 12.6 G/DL (ref 11.1–15.9)
HGB UR QL STRIP: NEGATIVE
KETONES UR QL STRIP: NEGATIVE
LEUKOCYTE ESTERASE UR QL STRIP: ABNORMAL
MCH RBC QN AUTO: 32.1 PG (ref 26.6–33)
MCHC RBC AUTO-ENTMCNC: 33.2 G/DL (ref 31.5–35.7)
MCV RBC AUTO: 97 FL (ref 79–97)
NITRITE UR QL STRIP: NEGATIVE
PH UR STRIP: 5.5 [PH] (ref 5–7.5)
PLATELET # BLD AUTO: 230 X10E3/UL (ref 150–450)
POTASSIUM SERPL-SCNC: 4.3 MMOL/L (ref 3.5–5.2)
PROT SERPL-MCNC: 7.3 G/DL (ref 6–8.5)
PROT UR QL STRIP: NEGATIVE
PTH-INTACT SERPL-MCNC: 16 PG/ML (ref 15–65)
RBC # BLD AUTO: 3.92 X10E6/UL (ref 3.77–5.28)
RBC #/AREA URNS HPF: NORMAL /HPF (ref 0–2)
SODIUM SERPL-SCNC: 137 MMOL/L (ref 134–144)
SP GR UR STRIP: 1 (ref 1–1.03)
SPECIMEN STATUS: NORMAL
URATE SERPL-MCNC: 5.1 MG/DL (ref 3.1–7.9)
UROBILINOGEN UR STRIP-MCNC: 0.2 MG/DL (ref 0.2–1)
WBC # BLD AUTO: 8.4 X10E3/UL (ref 3.4–10.8)
WBC #/AREA URNS HPF: NORMAL /HPF (ref 0–5)

## 2022-09-01 LAB
CREAT UR-MCNC: 36.4 MG/DL
PROT UR-MCNC: 4.9 MG/DL
PROT/CREAT UR: 135 MG/G CREAT (ref 0–200)

## 2022-09-19 ENCOUNTER — TELEPHONE (OUTPATIENT)
Dept: FAMILY MEDICINE CLINIC | Facility: CLINIC | Age: 72
End: 2022-09-19

## 2022-10-20 ENCOUNTER — TELEPHONE (OUTPATIENT)
Dept: FAMILY MEDICINE CLINIC | Facility: CLINIC | Age: 72
End: 2022-10-20

## 2022-10-20 RX ORDER — FLUOXETINE HYDROCHLORIDE 40 MG/1
40 CAPSULE ORAL EVERY MORNING
Qty: 90 CAPSULE | Refills: 0 | Status: SHIPPED | OUTPATIENT
Start: 2022-10-20 | End: 2022-12-12 | Stop reason: SDUPTHER

## 2022-10-20 NOTE — TELEPHONE ENCOUNTER
Incoming Refill Request      Medication requested (name and dose): fluoxetine hydrocloride cap 40 mg    Pharmacy where request should be sent: [facility] mail order    Additional details provided by patient: pt has 6 at home    Best call back number: ph [phone]    Does the patient have less than a 3 day supply:  [] Yes  [x] No    Miguel A Thacker Rep  10/20/22, 15:15 EDT

## 2022-12-12 ENCOUNTER — OFFICE VISIT (OUTPATIENT)
Dept: FAMILY MEDICINE CLINIC | Facility: CLINIC | Age: 72
End: 2022-12-12

## 2022-12-12 ENCOUNTER — TELEPHONE (OUTPATIENT)
Dept: FAMILY MEDICINE CLINIC | Facility: CLINIC | Age: 72
End: 2022-12-12

## 2022-12-12 VITALS
HEIGHT: 63 IN | OXYGEN SATURATION: 96 % | WEIGHT: 228 LBS | DIASTOLIC BLOOD PRESSURE: 86 MMHG | BODY MASS INDEX: 40.4 KG/M2 | HEART RATE: 62 BPM | SYSTOLIC BLOOD PRESSURE: 158 MMHG

## 2022-12-12 DIAGNOSIS — I10 ESSENTIAL HYPERTENSION: Primary | ICD-10-CM

## 2022-12-12 DIAGNOSIS — E11.22 CKD STAGE 3 DUE TO TYPE 2 DIABETES MELLITUS: ICD-10-CM

## 2022-12-12 DIAGNOSIS — Z85.3 HISTORY OF BREAST CANCER: ICD-10-CM

## 2022-12-12 DIAGNOSIS — K21.9 GASTROESOPHAGEAL REFLUX DISEASE WITHOUT ESOPHAGITIS: ICD-10-CM

## 2022-12-12 DIAGNOSIS — E11.22 TYPE 2 DIABETES MELLITUS WITH STAGE 3A CHRONIC KIDNEY DISEASE, WITH LONG-TERM CURRENT USE OF INSULIN: ICD-10-CM

## 2022-12-12 DIAGNOSIS — N18.30 CKD STAGE 3 DUE TO TYPE 2 DIABETES MELLITUS: ICD-10-CM

## 2022-12-12 DIAGNOSIS — M1A.00X0 IDIOPATHIC CHRONIC GOUT WITHOUT TOPHUS, UNSPECIFIED SITE: ICD-10-CM

## 2022-12-12 DIAGNOSIS — E78.2 MIXED HYPERLIPIDEMIA: ICD-10-CM

## 2022-12-12 DIAGNOSIS — F51.01 PRIMARY INSOMNIA: ICD-10-CM

## 2022-12-12 DIAGNOSIS — N18.31 TYPE 2 DIABETES MELLITUS WITH STAGE 3A CHRONIC KIDNEY DISEASE, WITH LONG-TERM CURRENT USE OF INSULIN: ICD-10-CM

## 2022-12-12 DIAGNOSIS — Z79.4 TYPE 2 DIABETES MELLITUS WITH STAGE 3A CHRONIC KIDNEY DISEASE, WITH LONG-TERM CURRENT USE OF INSULIN: ICD-10-CM

## 2022-12-12 PROCEDURE — 99213 OFFICE O/P EST LOW 20 MIN: CPT | Performed by: FAMILY MEDICINE

## 2022-12-12 RX ORDER — TRAZODONE HYDROCHLORIDE 50 MG/1
50 TABLET ORAL NIGHTLY
Qty: 180 TABLET | Refills: 1 | Status: SHIPPED | OUTPATIENT
Start: 2022-12-12

## 2022-12-12 RX ORDER — ATORVASTATIN CALCIUM 80 MG/1
80 TABLET, FILM COATED ORAL DAILY
Qty: 90 TABLET | Refills: 3 | Status: SHIPPED | OUTPATIENT
Start: 2022-12-12

## 2022-12-12 RX ORDER — TORSEMIDE 20 MG/1
20 TABLET ORAL DAILY
Qty: 90 TABLET | Refills: 1 | Status: SHIPPED | OUTPATIENT
Start: 2022-12-12

## 2022-12-12 RX ORDER — FLUOXETINE HYDROCHLORIDE 40 MG/1
40 CAPSULE ORAL EVERY MORNING
Qty: 90 CAPSULE | Refills: 3 | Status: SHIPPED | OUTPATIENT
Start: 2022-12-12

## 2022-12-12 NOTE — PROGRESS NOTES
Office Note     Name: Jyoti Gray    : 1950     MRN: [id number]     Chief Complaint  Med Refill, Hyperlipidemia (Pt is here to discuss her hyperlipidemia. She has stopped her meds. ), and Hypertension    Subjective     History of Present Illness:  Jyoti Gray is a 72 y.o. female who presents today for 30-year and 2 months since I have seen year and she having trouble sleeping.  She brings in an Ambien 10 mg, she is taking half of them, over several months of got off of it.  When I introduced her to another called trazodone she will call if she needs her Ambien refilled.  Got off of a T milligrams atorvastatin.  And has taken 20 mEq of potassium, she was taken off of it.  She thinks as she gets an amount of sleep blood pressure will come down.  She has a cuff at home and I expect her to use it.    She is going to the cardiologist who have her on 25/35 every morning and 10 or 15 at night depending on what it shows.  Encouraged her to go back Wednesday.  Review of Systems:   Review of Systems    Past Medical History:   Past Medical History:   Diagnosis Date   • Anxiety    • Breast cancer (HCC)    • Chronic gout without tophus    • Chronic kidney disease, stage 4 (severe) (HCC)    • Cobalamin deficiency    • Essential hypertension    • Fibromyalgia    • GERD without esophagitis    • Gout    • High risk medication use    • History of malignant neoplasm of breast    • Hyperglycemia due to type 1 diabetes mellitus (HCC)    • Kidney failure    • Lipoatrophic diabetes (HCC)    • Mixed hyperlipidemia    • Obesity    • Primary insomnia    • Severe major depression (HCC)    • Severe obesity (HCC)    • Type 1 diabetes mellitus (HCC)    • Vitamin D deficiency        Past Surgical History:   Past Surgical History:   Procedure Laterality Date   • ANKLE SURGERY Right    • ARTERIOVENOUS FISTULA     • BREAST LUMPECTOMY Right 2006    Malignant   • COLONOSCOPY  2016    NORMAL- HP POLYPS - REPEAT 10 YEARS   •  COLONOSCOPY     • NECK SURGERY      to remove port   • SKIN CANCER EXCISION      arm and leg   • TUBAL ABDOMINAL LIGATION         Family History:   Family History   Problem Relation Age of Onset   • Diabetes Mother    • Hyperlipidemia Mother    • Hypertension Mother    • Obesity Mother    • Heart disease Mother    • Heart attack Mother    • Diabetes Father    • Lung cancer Father    • Stroke Sister    • No Known Problems Sister    • No Known Problems Sister    • No Known Problems Sister    • Cancer Brother    • Lung cancer Brother    • No Known Problems Son        Social History:   Social History     Socioeconomic History   • Marital status:    • Number of children: 1   • Highest education level: 12th grade   Tobacco Use   • Smoking status: Never   • Smokeless tobacco: Never   Vaping Use   • Vaping Use: Never used   Substance and Sexual Activity   • Alcohol use: Never   • Drug use: Never   • Sexual activity: Defer       Immunizations:   Immunization History   Administered Date(s) Administered   • COVID-19 (MODERNA) 1st, 2nd, 3rd Dose Only 03/03/2021, 03/31/2021, 12/09/2021   • Fluzone High Dose =>65 Years ([employer] ONLY) 11/21/2016, 10/03/2018, 03/07/2020   • Fluzone High-Dose 65+yrs 11/03/2020   • Fluzone Quad >6mos (Multi-dose) 11/03/2020   • Pneumococcal Conjugate 13-Valent (PCV13) 11/03/2020   • Pneumococcal Conjugate 20-Valent (PCV20) 07/14/2022   • Shingrix 09/15/2022   • Tdap 09/15/2022        Medications:     Current Outpatient Medications:   •  allopurinol (ZYLOPRIM) 100 MG tablet, TAKE 1 TABLET DAILY, Disp: 90 tablet, Rfl: 1  •  atenolol (TENORMIN) 100 MG tablet, TAKE 1 TABLET DAILY, Disp: 90 tablet, Rfl: 1  •  atorvastatin (LIPITOR) 80 MG tablet, Take 1 tablet by mouth Daily., Disp: 90 tablet, Rfl: 3  •  Continuous Blood Gluc  (Dexcom G6 ) device, 1 each Continuous., Disp: 1 each, Rfl: 0  •  Continuous Blood Gluc Sensor (Dexcom G6 Sensor), Every 10 (Ten) Days., Disp: 3 each, Rfl:  "11  •  Continuous Blood Gluc Transmit (Dexcom G6 Transmitter) misc, 1 each Every 3 (Three) Months., Disp: 1 each, Rfl: 3  •  FLUoxetine (PROzac) 40 MG capsule, Take 1 capsule by mouth Every Morning., Disp: 90 capsule, Rfl: 3  •  insulin NPH-insulin regular (humuLIN 70/30,novoLIN 70/30) (70-30) 100 UNIT/ML injection, 35 units in the morning 20 units in the evening., Disp: 60 mL, Rfl: 1  •  losartan (COZAAR) 25 MG tablet, Take 12.5 mg by mouth Daily., Disp: , Rfl:   •  torsemide (DEMADEX) 20 MG tablet, Take 1 tablet by mouth Daily., Disp: 90 tablet, Rfl: 1  •  traZODone (DESYREL) 50 MG tablet, Take 1 tablet by mouth Every Night. May up it to 2 tablets a night  90 mins before bedtime., Disp: 180 tablet, Rfl: 1    Allergies:   No Known Allergies    Objective     Vital Signs  /86   Pulse 62   Ht 158.8 cm (62.5\")   Wt 103 kg (228 lb)   SpO2 96%   BMI 41.04 kg/m²   Estimated body mass index is 41.04 kg/m² as calculated from the following:    Height as of this encounter: 158.8 cm (62.5\").    Weight as of this encounter: 103 kg (228 lb).          Physical Exam  Vitals and nursing note reviewed.   Constitutional:       Appearance: She is obese.   HENT:      Head: Normocephalic and atraumatic.      Right Ear: Tympanic membrane, ear canal and external ear normal.      Left Ear: Tympanic membrane, ear canal and external ear normal.      Nose: Nose normal. No congestion or rhinorrhea.      Mouth/Throat:      Mouth: Mucous membranes are moist.   Eyes:      Extraocular Movements: Extraocular movements intact.      Conjunctiva/sclera: Conjunctivae normal.      Pupils: Pupils are equal, round, and reactive to light.   Neck:      Vascular: No carotid bruit.   Cardiovascular:      Rate and Rhythm: Normal rate and regular rhythm.   Pulmonary:      Effort: Pulmonary effort is normal.      Breath sounds: Normal breath sounds.   Lymphadenopathy:      Cervical: No cervical adenopathy.   Skin:     General: Skin is warm and dry. "   Neurological:      General: No focal deficit present.      Mental Status: She is alert.          Procedures     Assessment and Plan     1. Essential hypertension  We will have the patient take blood pressure readings when she gets no sleeps.  Tapering down to 120/80.  If she is not having it come down see as needed    2. Primary insomnia  Trazodone 50 mg up to 100 mg q. 90 minutes for    Bedtime    3. Type 2 diabetes mellitus with stage 3a chronic kidney disease, with long-term current use of insulin ([facility])  Of endocrinologist help    4. Mixed hyperlipidemia  Gone on back and getting on 80 mg atorvastatin    5. CKD stage 3 due to type 2 diabetes mellitus ([facility])  Looks good    6. Idiopathic chronic gout without tophus, unspecified site  Uric acid equal 5.1    7. Gastroesophageal reflux disease without esophagitis  Drug    8. History of breast cancer  Controlled       Follow Up  No follow-ups on file.    [facility]  [address] [id number] [phone]

## 2022-12-14 ENCOUNTER — OFFICE VISIT (OUTPATIENT)
Dept: ENDOCRINOLOGY | Facility: CLINIC | Age: 72
End: 2022-12-14

## 2022-12-14 VITALS
HEART RATE: 68 BPM | SYSTOLIC BLOOD PRESSURE: 140 MMHG | OXYGEN SATURATION: 94 % | HEIGHT: 62 IN | BODY MASS INDEX: 41.77 KG/M2 | WEIGHT: 227 LBS | DIASTOLIC BLOOD PRESSURE: 96 MMHG

## 2022-12-14 DIAGNOSIS — E78.2 MIXED HYPERLIPIDEMIA: ICD-10-CM

## 2022-12-14 DIAGNOSIS — E11.65 TYPE 2 DIABETES MELLITUS WITH HYPERGLYCEMIA, UNSPECIFIED WHETHER LONG TERM INSULIN USE: Primary | ICD-10-CM

## 2022-12-14 LAB
EXPIRATION DATE: ABNORMAL
EXPIRATION DATE: NORMAL
GLUCOSE BLDC GLUCOMTR-MCNC: 136 MG/DL (ref 70–130)
HBA1C MFR BLD: 7.9 %
Lab: ABNORMAL
Lab: NORMAL

## 2022-12-14 PROCEDURE — 99214 OFFICE O/P EST MOD 30 MIN: CPT | Performed by: INTERNAL MEDICINE

## 2022-12-14 PROCEDURE — 82947 ASSAY GLUCOSE BLOOD QUANT: CPT | Performed by: INTERNAL MEDICINE

## 2022-12-14 PROCEDURE — 83036 HEMOGLOBIN GLYCOSYLATED A1C: CPT | Performed by: INTERNAL MEDICINE

## 2022-12-14 PROCEDURE — 3051F HG A1C>EQUAL 7.0%<8.0%: CPT | Performed by: INTERNAL MEDICINE

## 2022-12-14 NOTE — PROGRESS NOTES
"Chief Complaint   Patient presents with   • Diabetes        HPI   Jyoti Gray is a 72 y.o. female had concerns including Diabetes.      35/20 30    The following portions of the patient's history were reviewed and updated as appropriate: {history reviewed:20406::\"allergies\",\"current medications\",\"past family history\",\"past medical history\",\"past social history\",\"past surgical history\",\"problem list\"}.    Review of Systems   ROS was reviewed and verified. All other ROS negative.    /96 (BP Location: Right arm, Patient Position: Sitting, Cuff Size: Adult)   Pulse 68   Ht 157.5 cm (62\")   Wt 103 kg (227 lb)   SpO2 94%   BMI 41.52 kg/m²      Physical Exam      Constitutional:  {Exam-Constitutional Findings:75177::\"well developed; well nourished\",\"no acute distress\"}   ENT/Thyroid: {Exam-ENT/Thyroid:44393::\"no palpable nodules\",\"no thyromegaly\"}   Eyes: {Exam-Eyes:41034::\"EOM intact\",\"Conjunctiva: clear\"}   Respiratory:  {Exam-Respiratory:26803::\"breathing is unlabored\",\"clear to auscultation bilaterally\"}   Cardiovascular:  {Exam-Cardiovascular:29655::\"regular rate and rhythm\"}   Chest:  {Exam-Chest:79144::\"Not performed.\"}   Abdomen: {Exam-Abdomen:79707::\"soft, non-tender; no masses\"}   : {Exam-:81491::\"Not performed.\"}   Musculoskeletal: {Exam-Musculoskeletal:50853::\"Not performed\"}   Skin: {Exam-Skin:50437::\"dry and warm\"}   Neuro: {Exam-Neuro:76747::\"normal without focal findings\",\"mental status, speech normal\"}   Psych: {Exam-Psych:07148::\"oriented to time, place and person\",\"mood and affect are within normal limits\"}       Labs/Imaging  ***    Diagnoses and all orders for this visit:    1. Type 2 diabetes mellitus with hyperglycemia, unspecified whether long term insulin use (HCC) (Primary)  -     POC Glucose, Blood  -     POC Glycosylated Hemoglobin (Hb A1C)         No follow-ups on file. The patient was instructed to contact the clinic with any interval questions or concerns.    Megan YOUSSEF" MD [facility]   Endocrinologist    Dictated Utilizing Dragon Dictation

## 2022-12-14 NOTE — PROGRESS NOTES
"Chief Complaint   Patient presents with   • Diabetes        HPI   Jyoti Gray is a 72 y.o. female had concerns including Diabetes.      Patient reports no significant health changes in the interim since her last visit.  She does report that she is slightly reduced morning dose of premixed insulin and states that 35 units with occasionally resulting in hyperglycemia.  She reports that she is generally not taking any more than 30 units and does occasionally reduce dose per the scale given last visit.  She rarely holds insulin completely.  She does report that she discontinued use of Dexcom as this was alarming frequently and she did not like it.  She is monitoring via fingerstick twice daily, values in the past week ranging , single value greater than 200.    Patient continues on atorvastatin 80 mg daily for hyperlipidemia.    The following portions of the patient's history were reviewed and updated as appropriate: allergies, current medications and past social history.    Review of Systems   Gastrointestinal: Negative for nausea and vomiting.   Musculoskeletal: Negative for myalgias.      /96 (BP Location: Right arm, Patient Position: Sitting, Cuff Size: Adult)   Pulse 68   Ht 157.5 cm (62\")   Wt 103 kg (227 lb)   SpO2 94%   BMI 41.52 kg/m²      Physical Exam      Constitutional:  well developed; well nourished  no acute distress   ENT/Thyroid: not examined   Eyes: Conjunctiva: clear   Respiratory:  breathing is unlabored  clear to auscultation bilaterally   Cardiovascular:  regular rate and rhythm   Chest:  Not performed.   Abdomen: soft, non-tender; no masses   : Not performed.   Musculoskeletal: Not performed   Skin: not performed.   Neuro: mental status, speech normal   Psych: mood and affect are within normal limits       Labs/Imaging   Latest Reference Range & Units 08/29/22 11:21 12/14/22 15:46 12/14/22 15:47   Glucose 70 - 130 mg/dL 190 (H) 136 !    Sodium 134 - 144 mmol/L 137   "   Potassium 3.5 - 5.2 mmol/L 4.3     CO2 20 - 29 mmol/L 19 (L)     Chloride 96 - 106 mmol/L 100     Creatinine 0.57 - 1.00 mg/dL 1.39 (H)     BUN 8 - 27 mg/dL 18     BUN/Creatinine Ratio 12 - 28  13     Calcium 8.7 - 10.3 mg/dL 9.9     EGFR Result >59 mL/min/1.73 41 (L)     Alkaline Phosphatase 44 - 121 IU/L 109     Total Protein 6.0 - 8.5 g/dL 7.3     Uric Acid 3.1 - 7.9 mg/dL 5.1     ALT (SGPT) 0 - 32 IU/L 8     AST (SGOT) 0 - 40 IU/L 15     Total Bilirubin 0.0 - 1.2 mg/dL 0.7     Albumin 3.7 - 4.7 g/dL 4.0     A/G Ratio 1.2 - 2.2  1.2     Hemoglobin A1C % 8.1 (H)  7.9   (H): Data is abnormally high  !: Data is abnormal  (L): Data is abnormally low     Latest Reference Range & Units 22 15:40   Creatinine, Urine Not Estab. mg/dL 320.4   Microalbumin, Urine Not Estab. ug/mL 18.8   Microalbumin/Creatinine Ratio 0 - 29 mg/g creat 6      Latest Reference Range & Units 22 11:31   Total Cholesterol 100 - 199 mg/dL 124   HDL Cholesterol >39 mg/dL 51   LDL Cholesterol  0 - 99 mg/dL 50   Triglycerides 0 - 149 mg/dL 131   VLDL Cholesterol [place] 5 - 40 mg/dL 23       Diagnoses and all orders for this visit:    1. Type 2 diabetes mellitus with hyperglycemia, unspecified whether long term insulin use (HCC) (Primary)  Uncontrolled with hemoglobin A1c 7.9%, improved from previous  Review of home glucose data reveals fasting sugars generally well controlled.  Patient does have some hyperglycemia later in the day when she is taking a reduced dose of 70/30 in the morning.  Plan to adjust morning dose of premixed insulin.  Patient will take the followin units if glucose greater than 150  25 units if glucose ranging 120-149  20 units if glucose ranging   Hold if less than 80  Patient will dose evening dose of premixed insulin per the following instructions:  20 units for glucose greater than 150  10 units for glucose ranging   Hold if less than 80  Patient was advised to monitor blood sugar 2 times daily.   Patient was instructed to bring glucometer to all future appointments. Patient should contact the clinic between appointments with hypoglycemia or persistent hyperglycemia.  Discussed signs and symptoms of hypoglycemia as well as hypoglycemia management via the rule of 15's.  Discussed potential for long-term complications with uncontrolled diabetes including nephropathy, neuropathy, retinopathy, increased risk for cardiac disease.  Discussed the role of diet and exercise in the management of diabetes.  Lipid panel is up-to-date from April 2022  Urine microalbumin is up-to-date from July 2022  CMP is up-to-date from August 2022, EGFR 41  -     POC Glucose, Blood  -     POC Glycosylated Hemoglobin (Hb A1C)    2. Mixed hyperlipidemia  Most recent LDL at goal, continue atorvastatin    Other orders  -     insulin NPH-insulin regular (humuLIN 70/30,novoLIN 70/30) (70-30) 100 UNIT/ML injection; 30 units in the morning 20 units in the evening.  Dispense: 60 mL; Refill: 1      Return in about 4 months (around 4/14/2023) for Next scheduled follow up. The patient was instructed to contact the clinic with any interval questions or concerns.    Megan Baez MD   Endocrinologist    Dictated Utilizing Dragon Dictation

## 2023-02-17 RX ORDER — ALLOPURINOL 100 MG/1
TABLET ORAL
Qty: 90 TABLET | Refills: 1 | Status: SHIPPED | OUTPATIENT
Start: 2023-02-17

## 2023-03-07 NOTE — TELEPHONE ENCOUNTER
Patient came in with a letter from her insurance stating her humulin will not be covered by her insurance but novalog/novalin will be covered. She in out of her insulin and is [facility] [place].

## 2023-03-09 ENCOUNTER — TELEPHONE (OUTPATIENT)
Dept: ENDOCRINOLOGY | Facility: CLINIC | Age: 73
End: 2023-03-09
Payer: MEDICARE

## 2023-03-09 NOTE — TELEPHONE ENCOUNTER
Attempted to contact patient regarding whether or not she is currently taking Humulin. The current phone number in her chart said it was not in service. Unable to conctact

## 2023-03-16 RX ORDER — HUMAN INSULIN 100 [USP'U]/ML
INJECTION, SUSPENSION SUBCUTANEOUS
Qty: 15 ML | Refills: 3 | Status: SHIPPED | OUTPATIENT
Start: 2023-03-16

## 2023-04-20 RX ORDER — ATENOLOL 100 MG/1
TABLET ORAL
Qty: 90 TABLET | Refills: 1 | Status: SHIPPED | OUTPATIENT
Start: 2023-04-20

## 2023-06-12 ENCOUNTER — OFFICE VISIT (OUTPATIENT)
Dept: FAMILY MEDICINE CLINIC | Facility: CLINIC | Age: 73
End: 2023-06-12
Payer: MEDICARE

## 2023-06-12 VITALS
WEIGHT: 216 LBS | OXYGEN SATURATION: 94 % | SYSTOLIC BLOOD PRESSURE: 134 MMHG | DIASTOLIC BLOOD PRESSURE: 86 MMHG | HEART RATE: 65 BPM | HEIGHT: 62 IN | BODY MASS INDEX: 39.75 KG/M2

## 2023-06-12 DIAGNOSIS — E55.9 VITAMIN D DEFICIENCY: ICD-10-CM

## 2023-06-12 DIAGNOSIS — Z79.4 TYPE 2 DIABETES MELLITUS WITH STAGE 3A CHRONIC KIDNEY DISEASE, WITH LONG-TERM CURRENT USE OF INSULIN: ICD-10-CM

## 2023-06-12 DIAGNOSIS — M1A.00X0 IDIOPATHIC CHRONIC GOUT WITHOUT TOPHUS, UNSPECIFIED SITE: ICD-10-CM

## 2023-06-12 DIAGNOSIS — N18.31 TYPE 2 DIABETES MELLITUS WITH STAGE 3A CHRONIC KIDNEY DISEASE, WITH LONG-TERM CURRENT USE OF INSULIN: ICD-10-CM

## 2023-06-12 DIAGNOSIS — I10 ESSENTIAL HYPERTENSION: Primary | ICD-10-CM

## 2023-06-12 DIAGNOSIS — E87.6 HYPOKALEMIA: ICD-10-CM

## 2023-06-12 DIAGNOSIS — E11.22 TYPE 2 DIABETES MELLITUS WITH STAGE 3A CHRONIC KIDNEY DISEASE, WITH LONG-TERM CURRENT USE OF INSULIN: ICD-10-CM

## 2023-06-12 DIAGNOSIS — R53.83 OTHER FATIGUE: ICD-10-CM

## 2023-06-12 DIAGNOSIS — D53.1 MEGALOBLASTIC ANEMIA: ICD-10-CM

## 2023-06-12 DIAGNOSIS — K21.9 GASTROESOPHAGEAL REFLUX DISEASE WITHOUT ESOPHAGITIS: ICD-10-CM

## 2023-06-12 DIAGNOSIS — E78.2 MIXED HYPERLIPIDEMIA: ICD-10-CM

## 2023-06-12 RX ORDER — ATENOLOL 100 MG/1
100 TABLET ORAL DAILY
Qty: 90 TABLET | Refills: 1 | Status: SHIPPED | OUTPATIENT
Start: 2023-06-12

## 2023-06-12 RX ORDER — ALLOPURINOL 100 MG/1
100 TABLET ORAL DAILY
Qty: 90 TABLET | Refills: 3 | Status: SHIPPED | OUTPATIENT
Start: 2023-06-12

## 2023-06-12 RX ORDER — HYDROXYZINE HYDROCHLORIDE 25 MG/1
25 TABLET, FILM COATED ORAL 3 TIMES DAILY PRN
Qty: 90 TABLET | Refills: 1 | Status: SHIPPED | OUTPATIENT
Start: 2023-06-12

## 2023-06-12 RX ORDER — LOSARTAN POTASSIUM 25 MG/1
25 TABLET ORAL DAILY
Qty: 90 TABLET | Refills: 3 | Status: SHIPPED | OUTPATIENT
Start: 2023-06-12

## 2023-06-12 NOTE — PROGRESS NOTES
Office Note     Name: Jyoti Gray    : 1950     MRN: [id number]     Chief Complaint  Med Refill (Check up )    Subjective     History of Present Illness:  Jyoti Gray is a 72 y.o. female who presents today for refill of her gout medicine, blood pressure medicine, fibromyalgia, and trazodone did no good.  She has constant itch keeping her awake.  She has type 1 diabetes mellitus Dr. Megan Baez prescribing 30 units every morning and 20 units every afternoon    Review of Systems:   Review of Systems    Past Medical History:   Past Medical History:   Diagnosis Date    Anxiety     Breast cancer     Chronic gout without tophus     Chronic kidney disease, stage 4 (severe)     Cobalamin deficiency     Essential hypertension     Fibromyalgia     GERD without esophagitis     Gout     High risk medication use     History of malignant neoplasm of breast     Hyperglycemia due to type 1 diabetes mellitus     Kidney failure     Lipoatrophic diabetes     Mixed hyperlipidemia     Obesity     Primary insomnia     Severe major depression     Severe obesity     Type 1 diabetes mellitus     Vitamin D deficiency        Past Surgical History:   Past Surgical History:   Procedure Laterality Date    ANKLE SURGERY Right     ARTERIOVENOUS FISTULA      BREAST LUMPECTOMY Right 2006    Malignant    COLONOSCOPY  2016    NORMAL- HP POLYPS - REPEAT 10 YEARS    COLONOSCOPY      NECK SURGERY      to remove port    SKIN CANCER EXCISION      arm and leg    TUBAL ABDOMINAL LIGATION         Family History:   Family History   Problem Relation Age of Onset    Diabetes Mother     Hyperlipidemia Mother     Hypertension Mother     Obesity Mother     Heart disease Mother     Heart attack Mother     Diabetes Father     Lung cancer Father     Stroke Sister     No Known Problems Sister     No Known Problems Sister     No Known Problems Sister     Cancer Brother     Lung cancer Brother     No Known Problems Son        Social History:  "  Social History     Socioeconomic History    Marital status:     Number of children: 1    Highest education level: 12th grade   Tobacco Use    Smoking status: Never    Smokeless tobacco: Never   Vaping Use    Vaping Use: Never used   Substance and Sexual Activity    Alcohol use: Never    Drug use: Never    Sexual activity: Defer       Immunizations:   Immunization History   Administered Date(s) Administered    COVID-19 (MODERNA) 1st,2nd,3rd Dose Monovalent 03/03/2021, 03/31/2021, 12/09/2021    Fluzone High Dose =>65 Years ([employer] ONLY) 11/21/2016, 10/03/2018, 03/07/2020    Fluzone High-Dose 65+yrs 11/03/2020    Fluzone Quad >6mos (Multi-dose) 11/03/2020    Pneumococcal Conjugate 13-Valent (PCV13) 11/03/2020    Pneumococcal Conjugate 20-Valent (PCV20) 07/14/2022    Shingrix 09/15/2022    Tdap 09/15/2022        Medications:     Current Outpatient Medications:     allopurinol (ZYLOPRIM) 100 MG tablet, Take 1 tablet by mouth Daily., Disp: 90 tablet, Rfl: 3    atenolol (TENORMIN) 100 MG tablet, Take 1 tablet by mouth Daily., Disp: 90 tablet, Rfl: 1    atorvastatin (LIPITOR) 80 MG tablet, Take 1 tablet by mouth Daily., Disp: 90 tablet, Rfl: 3    Continuous Blood Gluc Transmit (Dexcom G6 Transmitter) misc, 1 each Every 3 (Three) Months., Disp: 1 each, Rfl: 3    FLUoxetine (PROzac) 40 MG capsule, Take 1 capsule by mouth Every Morning., Disp: 90 capsule, Rfl: 3    insulin NPH-insulin regular (NovoLIN 70/30) (70-30) 100 UNIT/ML injection, INJECT 30 UNITS IN THE MORNING 20 UNITS IN THE EVENING, Disp: 15 mL, Rfl: 3    losartan (COZAAR) 25 MG tablet, Take 1 tablet by mouth Daily., Disp: 90 tablet, Rfl: 3    torsemide (DEMADEX) 20 MG tablet, Take 1 tablet by mouth Daily., Disp: 90 tablet, Rfl: 1    Allergies:   No Known Allergies    Objective     Vital Signs  /86 (BP Location: Left arm, Patient Position: Sitting, Cuff Size: Large Adult)   Pulse 65   Ht 157.5 cm (62\")   Wt 98 kg (216 lb)   SpO2 94%   BMI 39.51 " "kg/m²   Estimated body mass index is 39.51 kg/m² as calculated from the following:    Height as of this encounter: 157.5 cm (62\").    Weight as of this encounter: 98 kg (216 lb).          Physical Exam  Vitals and nursing note reviewed.   Constitutional:       Appearance: Normal appearance. She is obese.   HENT:      Head: Normocephalic.      Right Ear: External ear normal.      Left Ear: External ear normal.      Nose: Nose normal.   Eyes:      Pupils: Pupils are equal, round, and reactive to light.   Cardiovascular:      Rate and Rhythm: Normal rate and regular rhythm.      Pulses: Normal pulses.      Heart sounds: Normal heart sounds.   Pulmonary:      Effort: Pulmonary effort is normal.      Breath sounds: Normal breath sounds.   Musculoskeletal:      Cervical back: Normal range of motion and neck supple.   Skin:     General: Skin is warm and dry.   Neurological:      Mental Status: She is alert.   Psychiatric:         Mood and Affect: Mood normal.        Procedures     Assessment and Plan     1. Essential hypertension  We will increase her from 12.5 to 25 mg losartan, pretty much occasionally watching the potassium  - Comprehensive Metabolic Panel    2. Mixed hyperlipidemia  I have already put it put in lab work to be done for her, she refused to do it, she do it for the kidney doctors  - Lipid Panel    3. Gastroesophageal reflux disease without esophagitis  controlled    4. Idiopathic chronic gout without tophus, unspecified site  Well-controlled with a uric acid of 5.1  - Uric acid; Future    5. Type 2 diabetes mellitus with stage 3a chronic kidney disease, with long-term current use of insulin    - Hemoglobin A1c    6. Other fatigue    - CBC & Differential  - TSH    7. Vitamin D deficiency    - Vitamin D,25-Hydroxy    8. Megaloblastic anemia    - Vitamin B12 & Folate    9. Hypokalemia    - Magnesium       Follow Up  Return in about 6 months (around 12/12/2023).    Cooper Aaron MD  [facility] " [facility]  [address]  [facility] [id number] [phone]

## 2023-07-31 RX ORDER — HUMAN INSULIN 100 [USP'U]/ML
INJECTION, SUSPENSION SUBCUTANEOUS
Qty: 10 ML | Refills: 3 | Status: SHIPPED | OUTPATIENT
Start: 2023-07-31

## 2023-08-01 RX ORDER — HYDROXYZINE HYDROCHLORIDE 25 MG/1
TABLET, FILM COATED ORAL
Qty: 90 TABLET | Refills: 1 | OUTPATIENT
Start: 2023-08-01

## 2023-08-03 ENCOUNTER — TELEPHONE (OUTPATIENT)
Dept: FAMILY MEDICINE CLINIC | Facility: CLINIC | Age: 73
End: 2023-08-03
Payer: MEDICARE

## 2023-08-03 NOTE — TELEPHONE ENCOUNTER
Incoming Refill Request      Medication requested (name and dose):     Allopurinol 100Mg   Atenolol 100Mg    [facility] [place]     Additional details provided by patient: pt states that she is out and does not want to wait for them to come in by mail    Best call back number: [phone]    Does the patient have less than a 3 day supply:  [x] Yes  [] No    Miguel A Drummond Rep  08/03/23, 10:35 EDT

## 2023-08-14 RX ORDER — ALLOPURINOL 100 MG/1
TABLET ORAL
Qty: 90 TABLET | Refills: 1 | Status: SHIPPED | OUTPATIENT
Start: 2023-08-14

## 2023-10-04 ENCOUNTER — TELEPHONE (OUTPATIENT)
Dept: FAMILY MEDICINE CLINIC | Facility: CLINIC | Age: 73
End: 2023-10-04
Payer: MEDICARE

## 2023-10-16 ENCOUNTER — LAB (OUTPATIENT)
Dept: FAMILY MEDICINE CLINIC | Facility: CLINIC | Age: 73
End: 2023-10-16
Payer: MEDICARE

## 2023-10-16 DIAGNOSIS — E55.9 VITAMIN D DEFICIENCY: ICD-10-CM

## 2023-10-16 DIAGNOSIS — N18.31 TYPE 2 DIABETES MELLITUS WITH STAGE 3A CHRONIC KIDNEY DISEASE, WITH LONG-TERM CURRENT USE OF INSULIN: ICD-10-CM

## 2023-10-16 DIAGNOSIS — I10 ESSENTIAL HYPERTENSION: ICD-10-CM

## 2023-10-16 DIAGNOSIS — Z79.4 TYPE 2 DIABETES MELLITUS WITH STAGE 3A CHRONIC KIDNEY DISEASE, WITH LONG-TERM CURRENT USE OF INSULIN: ICD-10-CM

## 2023-10-16 DIAGNOSIS — E11.22 TYPE 2 DIABETES MELLITUS WITH STAGE 3A CHRONIC KIDNEY DISEASE, WITH LONG-TERM CURRENT USE OF INSULIN: ICD-10-CM

## 2023-10-16 DIAGNOSIS — M1A.00X0 IDIOPATHIC CHRONIC GOUT WITHOUT TOPHUS, UNSPECIFIED SITE: Primary | ICD-10-CM

## 2023-10-16 DIAGNOSIS — R53.83 OTHER FATIGUE: ICD-10-CM

## 2023-10-16 DIAGNOSIS — E78.2 MIXED HYPERLIPIDEMIA: ICD-10-CM

## 2023-10-17 LAB
25(OH)D3+25(OH)D2 SERPL-MCNC: 28.2 NG/ML (ref 30–100)
ALBUMIN SERPL-MCNC: 3.8 G/DL (ref 3.8–4.8)
ALBUMIN/GLOB SERPL: 1.3 {RATIO} (ref 1.2–2.2)
ALP SERPL-CCNC: 106 IU/L (ref 44–121)
ALT SERPL-CCNC: 9 IU/L (ref 0–32)
AST SERPL-CCNC: 13 IU/L (ref 0–40)
BASOPHILS # BLD AUTO: 0 X10E3/UL (ref 0–0.2)
BASOPHILS NFR BLD AUTO: 0 %
BILIRUB SERPL-MCNC: 1.3 MG/DL (ref 0–1.2)
BUN SERPL-MCNC: 18 MG/DL (ref 8–27)
BUN/CREAT SERPL: 14 (ref 12–28)
CALCIUM SERPL-MCNC: 9.2 MG/DL (ref 8.7–10.3)
CHLORIDE SERPL-SCNC: 94 MMOL/L (ref 96–106)
CHOLEST SERPL-MCNC: 111 MG/DL (ref 100–199)
CO2 SERPL-SCNC: 27 MMOL/L (ref 20–29)
CREAT SERPL-MCNC: 1.25 MG/DL (ref 0.57–1)
EGFRCR SERPLBLD CKD-EPI 2021: 46 ML/MIN/1.73
EOSINOPHIL # BLD AUTO: 0.3 X10E3/UL (ref 0–0.4)
EOSINOPHIL NFR BLD AUTO: 4 %
ERYTHROCYTE [DISTWIDTH] IN BLOOD BY AUTOMATED COUNT: 13.3 % (ref 11.7–15.4)
FOLATE SERPL-MCNC: 9.5 NG/ML
GLOBULIN SER CALC-MCNC: 2.9 G/DL (ref 1.5–4.5)
GLUCOSE SERPL-MCNC: 258 MG/DL (ref 70–99)
HBA1C MFR BLD: 10.2 % (ref 4.8–5.6)
HCT VFR BLD AUTO: 35 % (ref 34–46.6)
HDLC SERPL-MCNC: 55 MG/DL
HGB BLD-MCNC: 11.7 G/DL (ref 11.1–15.9)
IMM GRANULOCYTES # BLD AUTO: 0 X10E3/UL (ref 0–0.1)
IMM GRANULOCYTES NFR BLD AUTO: 0 %
LDLC SERPL CALC-MCNC: 36 MG/DL (ref 0–99)
LYMPHOCYTES # BLD AUTO: 2.2 X10E3/UL (ref 0.7–3.1)
LYMPHOCYTES NFR BLD AUTO: 27 %
MAGNESIUM SERPL-MCNC: 1.5 MG/DL (ref 1.6–2.3)
MCH RBC QN AUTO: 32.1 PG (ref 26.6–33)
MCHC RBC AUTO-ENTMCNC: 33.4 G/DL (ref 31.5–35.7)
MCV RBC AUTO: 96 FL (ref 79–97)
MONOCYTES # BLD AUTO: 0.6 X10E3/UL (ref 0.1–0.9)
MONOCYTES NFR BLD AUTO: 7 %
NEUTROPHILS # BLD AUTO: 5 X10E3/UL (ref 1.4–7)
NEUTROPHILS NFR BLD AUTO: 62 %
PLATELET # BLD AUTO: 193 X10E3/UL (ref 150–450)
POTASSIUM SERPL-SCNC: 3.7 MMOL/L (ref 3.5–5.2)
PROT SERPL-MCNC: 6.7 G/DL (ref 6–8.5)
RBC # BLD AUTO: 3.64 X10E6/UL (ref 3.77–5.28)
SODIUM SERPL-SCNC: 134 MMOL/L (ref 134–144)
TRIGL SERPL-MCNC: 110 MG/DL (ref 0–149)
TSH SERPL DL<=0.005 MIU/L-ACNC: 2.47 UIU/ML (ref 0.45–4.5)
URATE SERPL-MCNC: 5.3 MG/DL (ref 3.1–7.9)
VIT B12 SERPL-MCNC: 289 PG/ML (ref 232–1245)
VLDLC SERPL CALC-MCNC: 20 MG/DL (ref 5–40)
WBC # BLD AUTO: 8.2 X10E3/UL (ref 3.4–10.8)

## 2023-11-06 RX ORDER — ATENOLOL 100 MG/1
100 TABLET ORAL DAILY
Qty: 90 TABLET | Refills: 1 | Status: SHIPPED | OUTPATIENT
Start: 2023-11-06

## 2023-12-04 RX ORDER — HUMAN INSULIN 100 [USP'U]/ML
INJECTION, SUSPENSION SUBCUTANEOUS
Qty: 10 ML | Refills: 0 | Status: SHIPPED | OUTPATIENT
Start: 2023-12-04

## 2024-01-03 ENCOUNTER — TELEPHONE (OUTPATIENT)
Dept: FAMILY MEDICINE CLINIC | Facility: CLINIC | Age: 74
End: 2024-01-03
Payer: MEDICARE

## 2024-01-03 RX ORDER — HUMAN INSULIN 100 [USP'U]/ML
INJECTION, SUSPENSION SUBCUTANEOUS
Qty: 15 ML | Refills: 5 | Status: SHIPPED | OUTPATIENT
Start: 2024-01-03

## 2024-01-03 RX ORDER — HUMAN INSULIN 100 [USP'U]/ML
INJECTION, SUSPENSION SUBCUTANEOUS
Qty: 10 ML | Refills: 0 | Status: SHIPPED | OUTPATIENT
Start: 2024-01-03 | End: 2024-01-03 | Stop reason: SDUPTHER

## 2024-01-03 RX ORDER — HUMAN INSULIN 100 [USP'U]/ML
INJECTION, SUSPENSION SUBCUTANEOUS
Qty: 100 ML | Refills: 1 | Status: SHIPPED | OUTPATIENT
Start: 2024-01-03 | End: 2024-01-03 | Stop reason: SDUPTHER

## 2024-01-03 NOTE — TELEPHONE ENCOUNTER
Ms. Gray came into the office needing a refill on her insulin. I requested that be sent to the [facility] in [place], [place]. Kelly made sure it got called in but requested I call patient and get appointment made, but her phone number will not ring through. I even verified that was her phone number before she left. I'm unaware on how to get the information passed along to her.

## 2024-01-03 NOTE — TELEPHONE ENCOUNTER
Pharmacy contacted instructed to fill for 6 months.  15 ml with 5 refills.   Pharmacy will call back if not correct

## 2024-01-22 ENCOUNTER — TELEPHONE (OUTPATIENT)
Dept: FAMILY MEDICINE CLINIC | Facility: CLINIC | Age: 74
End: 2024-01-22
Payer: MEDICARE

## 2024-01-31 ENCOUNTER — OFFICE VISIT (OUTPATIENT)
Dept: FAMILY MEDICINE CLINIC | Facility: CLINIC | Age: 74
End: 2024-01-31
Payer: MEDICARE

## 2024-01-31 VITALS
DIASTOLIC BLOOD PRESSURE: 82 MMHG | HEART RATE: 63 BPM | HEIGHT: 62 IN | BODY MASS INDEX: 40.3 KG/M2 | WEIGHT: 219 LBS | SYSTOLIC BLOOD PRESSURE: 126 MMHG | OXYGEN SATURATION: 96 %
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
## 2024-01-31 DIAGNOSIS — N18.30 CKD STAGE 3 DUE TO TYPE 2 DIABETES MELLITUS: ICD-10-CM

## 2024-01-31 DIAGNOSIS — Z79.4 TYPE 2 DIABETES MELLITUS WITH STAGE 3A CHRONIC KIDNEY DISEASE, WITH LONG-TERM CURRENT USE OF INSULIN: Primary | ICD-10-CM

## 2024-01-31 DIAGNOSIS — I10 ESSENTIAL HYPERTENSION: ICD-10-CM

## 2024-01-31 DIAGNOSIS — E11.22 TYPE 2 DIABETES MELLITUS WITH STAGE 3A CHRONIC KIDNEY DISEASE, WITH LONG-TERM CURRENT USE OF INSULIN: Primary | ICD-10-CM

## 2024-01-31 DIAGNOSIS — K21.9 GASTROESOPHAGEAL REFLUX DISEASE WITHOUT ESOPHAGITIS: ICD-10-CM

## 2024-01-31 DIAGNOSIS — E11.22 CKD STAGE 3 DUE TO TYPE 2 DIABETES MELLITUS: ICD-10-CM

## 2024-01-31 DIAGNOSIS — N18.31 TYPE 2 DIABETES MELLITUS WITH STAGE 3A CHRONIC KIDNEY DISEASE, WITH LONG-TERM CURRENT USE OF INSULIN: Primary | ICD-10-CM

## 2024-01-31 DIAGNOSIS — E78.2 MIXED HYPERLIPIDEMIA: ICD-10-CM

## 2024-01-31 LAB
EXPIRATION DATE: ABNORMAL
HBA1C MFR BLD: 10.3 % (ref 4.5–5.7)
Lab: ABNORMAL

## 2024-01-31 RX ORDER — LANCETS
1 EACH MISCELLANEOUS 3 TIMES DAILY
Qty: 100 EACH | Refills: 3 | Status: SHIPPED | OUTPATIENT
Start: 2024-01-31

## 2024-01-31 RX ORDER — LANCETS
1 EACH MISCELLANEOUS 3 TIMES DAILY
COMMUNITY
End: 2024-01-31 | Stop reason: SDUPTHER

## 2024-01-31 RX ORDER — FLUOXETINE HYDROCHLORIDE 40 MG/1
40 CAPSULE ORAL EVERY MORNING
Qty: 90 CAPSULE | Refills: 3 | Status: SHIPPED | OUTPATIENT
Start: 2024-01-31

## 2024-01-31 RX ORDER — INSULIN HUMAN 100 [IU]/ML
INJECTION, SUSPENSION SUBCUTANEOUS
Qty: 7 EACH | Refills: 1 | Status: SHIPPED | OUTPATIENT
Start: 2024-01-31

## 2024-01-31 RX ORDER — ALLOPURINOL 100 MG/1
100 TABLET ORAL DAILY
Qty: 90 TABLET | Refills: 3 | Status: SHIPPED | OUTPATIENT
Start: 2024-01-31

## 2024-01-31 NOTE — PROGRESS NOTES
Office Note     Name: Jyoti Gray    : 1950     MRN: [id number]     Chief Complaint  Hypertension (Follow up/Face injury over a month old issues)    Subjective     History of Present Illness:  Jyoti Gray is a 73 y.o. female who presents today for injury to face over a month ago.  It began with half a step going into the garage.  Hit the concrete with her face, son helps her in the hemoglobin A1c reached the 10.24 and 5 months ago 10.3 at today she is getting 30 3 in the AM and 20 5 in the PM needs to be increased    Review of Systems:   Review of Systems    Past Medical History:   Past Medical History:   Diagnosis Date    Anxiety     Breast cancer     Chronic gout without tophus     Chronic kidney disease, stage 4 (severe)     Cobalamin deficiency     Essential hypertension     Fibromyalgia     GERD without esophagitis     Gout     High risk medication use     History of malignant neoplasm of breast     Hyperglycemia due to type 1 diabetes mellitus     Kidney failure     Lipoatrophic diabetes     Mixed hyperlipidemia     Obesity     Primary insomnia     Severe major depression     Severe obesity     Type 1 diabetes mellitus     Vitamin D deficiency        Past Surgical History:   Past Surgical History:   Procedure Laterality Date    ANKLE SURGERY Right     ARTERIOVENOUS FISTULA      BREAST LUMPECTOMY Right 2006    Malignant    COLONOSCOPY  2016    NORMAL- HP POLYPS - REPEAT 10 YEARS    COLONOSCOPY      NECK SURGERY      to remove port    SKIN CANCER EXCISION      arm and leg    TUBAL ABDOMINAL LIGATION         Family History:   Family History   Problem Relation Age of Onset    Diabetes Mother     Hyperlipidemia Mother     Hypertension Mother     Obesity Mother     Heart disease Mother     Heart attack Mother     Diabetes Father     Lung cancer Father     Stroke Sister     No Known Problems Sister     No Known Problems Sister     No Known Problems Sister     Cancer Brother     Lung cancer  Brother     No Known Problems Son        Social History:   Social History     Socioeconomic History    Marital status:     Number of children: 1    Highest education level: 12th grade   Tobacco Use    Smoking status: Never     Passive exposure: Never    Smokeless tobacco: Never   Vaping Use    Vaping Use: Never used   Substance and Sexual Activity    Alcohol use: Never    Drug use: Never    Sexual activity: Defer       Immunizations:   Immunization History   Administered Date(s) Administered    COVID-19 (MODERNA) 1st,2nd,3rd Dose Monovalent 03/03/2021, 03/31/2021, 12/09/2021    Fluzone High Dose =>65 Years ([employer] ONLY) 11/21/2016, 10/03/2018, 03/07/2020    Fluzone High-Dose 65+yrs 11/03/2020    Fluzone Quad >6mos (Multi-dose) 11/03/2020    Pneumococcal Conjugate 13-Valent (PCV13) 11/03/2020    Pneumococcal Conjugate 20-Valent (PCV20) 07/14/2022    Shingrix 09/15/2022    Tdap 09/15/2022        Medications:     Current Outpatient Medications:     Accu-Chek Softclix Lancets lancets, 1 each by Other route 3 times a day. 3 times a day, Disp: 100 each, Rfl: 3    allopurinol (ZYLOPRIM) 100 MG tablet, Take 1 tablet by mouth Daily., Disp: 90 tablet, Rfl: 3    atenolol (TENORMIN) 100 MG tablet, TAKE 1 TABLET DAILY, Disp: 90 tablet, Rfl: 1    atorvastatin (LIPITOR) 80 MG tablet, TAKE 1 TABLET BY MOUTH EVERY DAY, Disp: 90 tablet, Rfl: 2    Continuous Blood Gluc Transmit (Dexcom G6 Transmitter) misc, 1 each Every 3 (Three) Months., Disp: 1 each, Rfl: 3    FLUoxetine (PROzac) 40 MG capsule, Take 1 capsule by mouth Every Morning., Disp: 90 capsule, Rfl: 3    glucose blood test strip, 1 each by Other route 3 times a day. Use as instructed, Disp: 100 each, Rfl: 3    hydrOXYzine (ATARAX) 25 MG tablet, Take 1 tablet by mouth 3 (Three) Times a Day As Needed for Itching., Disp: 90 tablet, Rfl: 1    losartan (COZAAR) 25 MG tablet, Take 1 tablet by mouth Daily., Disp: 90 tablet, Rfl: 3    torsemide (DEMADEX) 20 MG tablet, Take 1  "tablet by mouth Daily., Disp: 90 tablet, Rfl: 1    insulin NPH-insulin regular (HumuLIN 70/30) (70-30) 100 UNIT/ML injection, Currently on 40 qam, 33 qpm, Disp: 7 each, Rfl: 1    Allergies:   No Known Allergies    Objective     Vital Signs  /82   Pulse 63   Ht 157.5 cm (62\")   Wt 99.3 kg (219 lb)   SpO2 96%   BMI 40.06 kg/m²   Estimated body mass index is 40.06 kg/m² as calculated from the following:    Height as of this encounter: 157.5 cm (62\").    Weight as of this encounter: 99.3 kg (219 lb).          Physical Exam  Vitals and nursing note reviewed.   Constitutional:       Appearance: Normal appearance. She is obese.   HENT:      Head: Normocephalic.        Comments: Right eyebrow tender, bruising on his left cheek EOMs full 4-week after a fall.  But first visit with us     Right Ear: External ear normal.      Left Ear: External ear normal.      Nose: Nose normal.   Eyes:      Pupils: Pupils are equal, round, and reactive to light.   Neck:      Vascular: No carotid bruit.   Cardiovascular:      Rate and Rhythm: Normal rate and regular rhythm.      Pulses: Normal pulses.      Heart sounds: Normal heart sounds.   Pulmonary:      Effort: Pulmonary effort is normal.      Breath sounds: Normal breath sounds.   Musculoskeletal:      Cervical back: Normal range of motion and neck supple.   Skin:     General: Skin is warm and dry.   Neurological:      Mental Status: She is alert.   Psychiatric:         Mood and Affect: Mood normal.          Procedures     Assessment and Plan     1. Type 2 diabetes mellitus with stage 3a chronic kidney disease, with long-term current use of insulin  Will increase to 40 every morning and 33 every afternoon  - POC Glycosylated Hemoglobin (Hb A1C)    2. Fall (on) (from) unspecified stairs and steps, sequela  Periorbital fracture?  The EOMs are full painless gets right-sided headache    3. Gastroesophageal reflux disease without esophagitis  Being treated    4. CKD stage 3 due to " type 2 diabetes mellitus      5. Essential hypertension  Being treated    6. Mixed hyperlipidemia  Being treated       Follow Up  Return in about 3 months (around 4/30/2024).    Cooper CARMONA PC [facility]  [address] [id number] [phone]

## 2024-02-26 ENCOUNTER — TELEPHONE (OUTPATIENT)
Dept: FAMILY MEDICINE CLINIC | Facility: CLINIC | Age: 74
End: 2024-02-26
Payer: MEDICARE

## 2024-02-26 DIAGNOSIS — N18.31 TYPE 2 DIABETES MELLITUS WITH STAGE 3A CHRONIC KIDNEY DISEASE, WITH LONG-TERM CURRENT USE OF INSULIN: Primary | ICD-10-CM

## 2024-02-26 DIAGNOSIS — Z79.4 TYPE 2 DIABETES MELLITUS WITH STAGE 3A CHRONIC KIDNEY DISEASE, WITH LONG-TERM CURRENT USE OF INSULIN: Primary | ICD-10-CM

## 2024-02-26 DIAGNOSIS — E11.22 TYPE 2 DIABETES MELLITUS WITH STAGE 3A CHRONIC KIDNEY DISEASE, WITH LONG-TERM CURRENT USE OF INSULIN: Primary | ICD-10-CM

## 2024-02-26 RX ORDER — NAPROXEN SODIUM 220 MG
1 TABLET ORAL TAKE AS DIRECTED
Qty: 100 EACH | Refills: 1 | Status: SHIPPED | OUTPATIENT
Start: 2024-02-26

## 2024-02-26 NOTE — TELEPHONE ENCOUNTER
Going to get hector briones put in order for insulin syringes in.  Pt takes up to 40 units at a time.

## 2024-03-27 ENCOUNTER — TELEPHONE (OUTPATIENT)
Dept: FAMILY MEDICINE CLINIC | Facility: CLINIC | Age: 74
End: 2024-03-27
Payer: MEDICARE

## 2024-05-05 RX ORDER — TORSEMIDE 20 MG/1
20 TABLET ORAL DAILY
Qty: 90 TABLET | Refills: 1 | Status: SHIPPED | OUTPATIENT
Start: 2024-05-05

## 2024-05-08 RX ORDER — LOSARTAN POTASSIUM 25 MG/1
25 TABLET ORAL DAILY
Qty: 30 TABLET | Refills: 0 | Status: SHIPPED | OUTPATIENT
Start: 2024-05-08

## 2024-05-28 RX ORDER — INSULIN HUMAN 100 [IU]/ML
INJECTION, SUSPENSION SUBCUTANEOUS
Qty: 30 ML | Refills: 1 | Status: SHIPPED | OUTPATIENT
Start: 2024-05-28

## 2024-06-18 ENCOUNTER — TELEPHONE (OUTPATIENT)
Dept: FAMILY MEDICINE CLINIC | Facility: CLINIC | Age: 74
End: 2024-06-18
Payer: MEDICARE

## 2024-06-18 NOTE — TELEPHONE ENCOUNTER
Patient came by and is requesting a refill on her one touch verio test strips. She would like them sent to the [facility] in [place].

## 2024-06-20 RX ORDER — ATENOLOL 100 MG/1
100 TABLET ORAL DAILY
Qty: 30 TABLET | Refills: 0 | Status: SHIPPED | OUTPATIENT
Start: 2024-06-20

## 2024-06-21 NOTE — TELEPHONE ENCOUNTER
"Tried to contacted pt, vm not set up.   Relay     \"We have refilled your medication but, you are due for an appointment with Dr. Aaron. Please schedule this appointment. Thanks.\"                 "

## 2024-06-24 RX ORDER — HYDROXYZINE HYDROCHLORIDE 25 MG/1
TABLET, FILM COATED ORAL
Qty: 90 TABLET | Refills: 0 | OUTPATIENT
Start: 2024-06-24

## 2024-06-27 NOTE — TELEPHONE ENCOUNTER
"Could not leave a voicemail for pt.  Relay     \"We cannot refill your medication but, you are due for an appt with Dr. Aaron. Please schedule this appt.\"              ;  "

## 2024-08-07 ENCOUNTER — TELEPHONE (OUTPATIENT)
Dept: CASE MANAGEMENT | Facility: OTHER | Age: 74
End: 2024-08-07
Payer: MEDICARE

## 2024-08-07 NOTE — TELEPHONE ENCOUNTER
Attempted to call for CCM services. Phone did not ring. Voicemail not set up, unable to leave VM.

## 2024-08-08 ENCOUNTER — TELEPHONE (OUTPATIENT)
Dept: CASE MANAGEMENT | Facility: OTHER | Age: 74
End: 2024-08-08
Payer: MEDICARE

## 2024-08-08 RX ORDER — HYDROXYZINE HYDROCHLORIDE 25 MG/1
TABLET, FILM COATED ORAL
Qty: 90 TABLET | Refills: 1 | OUTPATIENT
Start: 2024-08-08

## 2024-08-09 NOTE — TELEPHONE ENCOUNTER
Hub to relay  Lvm on sons phone that mom needs a med recheck appt with dr cruz before med can be refilled

## 2024-08-10 ENCOUNTER — TELEPHONE (OUTPATIENT)
Dept: FAMILY MEDICINE CLINIC | Facility: CLINIC | Age: 74
End: 2024-08-10
Payer: MEDICARE

## 2024-08-12 ENCOUNTER — TELEPHONE (OUTPATIENT)
Dept: CASE MANAGEMENT | Facility: OTHER | Age: 74
End: 2024-08-12
Payer: MEDICARE

## 2024-09-04 ENCOUNTER — EXTERNAL PBMM DATA (OUTPATIENT)
Dept: PHARMACY | Facility: OTHER | Age: 74
End: 2024-09-04
Payer: MEDICARE

## 2024-09-04 ENCOUNTER — TELEPHONE (OUTPATIENT)
Dept: FAMILY MEDICINE CLINIC | Facility: CLINIC | Age: 74
End: 2024-09-04
Payer: MEDICARE

## 2024-09-04 RX ORDER — INSULIN HUMAN 100 [IU]/ML
INJECTION, SUSPENSION SUBCUTANEOUS
Qty: 30 ML | Refills: 0 | Status: SHIPPED | OUTPATIENT
Start: 2024-09-04

## 2024-09-16 RX ORDER — TORSEMIDE 20 MG/1
20 TABLET ORAL DAILY
Qty: 90 TABLET | Refills: 1 | Status: SHIPPED | OUTPATIENT
Start: 2024-09-16

## 2024-09-16 RX ORDER — ATORVASTATIN CALCIUM 80 MG/1
80 TABLET, FILM COATED ORAL DAILY
Qty: 90 TABLET | Refills: 1 | Status: SHIPPED | OUTPATIENT
Start: 2024-09-16

## 2024-09-17 RX ORDER — ATENOLOL 100 MG/1
100 TABLET ORAL DAILY
Qty: 90 TABLET | Refills: 1 | Status: SHIPPED | OUTPATIENT
Start: 2024-09-17

## 2024-10-22 RX ORDER — INSULIN HUMAN 100 [IU]/ML
INJECTION, SUSPENSION SUBCUTANEOUS
Qty: 30 ML | Refills: 0 | Status: SHIPPED | OUTPATIENT
Start: 2024-10-22

## 2024-12-04 ENCOUNTER — OFFICE VISIT (OUTPATIENT)
Dept: FAMILY MEDICINE CLINIC | Facility: CLINIC | Age: 74
End: 2024-12-04
Payer: MEDICARE

## 2024-12-04 VITALS
DIASTOLIC BLOOD PRESSURE: 76 MMHG | HEART RATE: 58 BPM | OXYGEN SATURATION: 96 % | SYSTOLIC BLOOD PRESSURE: 124 MMHG | HEIGHT: 62 IN | WEIGHT: 220 LBS | BODY MASS INDEX: 40.48 KG/M2

## 2024-12-04 DIAGNOSIS — E78.2 MIXED HYPERLIPIDEMIA: ICD-10-CM

## 2024-12-04 DIAGNOSIS — R53.83 OTHER FATIGUE: ICD-10-CM

## 2024-12-04 DIAGNOSIS — E11.22 TYPE 2 DIABETES MELLITUS WITH STAGE 3A CHRONIC KIDNEY DISEASE, WITH LONG-TERM CURRENT USE OF INSULIN: Primary | ICD-10-CM

## 2024-12-04 DIAGNOSIS — Z79.4 TYPE 2 DIABETES MELLITUS WITH STAGE 3A CHRONIC KIDNEY DISEASE, WITH LONG-TERM CURRENT USE OF INSULIN: Primary | ICD-10-CM

## 2024-12-04 DIAGNOSIS — N18.31 TYPE 2 DIABETES MELLITUS WITH STAGE 3A CHRONIC KIDNEY DISEASE, WITH LONG-TERM CURRENT USE OF INSULIN: Primary | ICD-10-CM

## 2024-12-04 DIAGNOSIS — I10 ESSENTIAL HYPERTENSION: ICD-10-CM

## 2024-12-04 DIAGNOSIS — M1A.00X0 IDIOPATHIC CHRONIC GOUT WITHOUT TOPHUS, UNSPECIFIED SITE: ICD-10-CM

## 2024-12-04 DIAGNOSIS — E55.9 VITAMIN D DEFICIENCY: ICD-10-CM

## 2024-12-04 DIAGNOSIS — R25.2 CRAMP AND SPASM: ICD-10-CM

## 2024-12-04 LAB
EXPIRATION DATE: ABNORMAL
HBA1C MFR BLD: 9.5 % (ref 4.5–5.7)
Lab: ABNORMAL

## 2024-12-04 PROCEDURE — 3046F HEMOGLOBIN A1C LEVEL >9.0%: CPT | Performed by: FAMILY MEDICINE

## 2024-12-04 PROCEDURE — 99214 OFFICE O/P EST MOD 30 MIN: CPT | Performed by: FAMILY MEDICINE

## 2024-12-04 PROCEDURE — 1160F RVW MEDS BY RX/DR IN RCRD: CPT | Performed by: FAMILY MEDICINE

## 2024-12-04 PROCEDURE — 83036 HEMOGLOBIN GLYCOSYLATED A1C: CPT | Performed by: FAMILY MEDICINE

## 2024-12-04 PROCEDURE — 1125F AMNT PAIN NOTED PAIN PRSNT: CPT | Performed by: FAMILY MEDICINE

## 2024-12-04 PROCEDURE — 1159F MED LIST DOCD IN RCRD: CPT | Performed by: FAMILY MEDICINE

## 2024-12-04 PROCEDURE — 3074F SYST BP LT 130 MM HG: CPT | Performed by: FAMILY MEDICINE

## 2024-12-04 PROCEDURE — 3078F DIAST BP <80 MM HG: CPT | Performed by: FAMILY MEDICINE

## 2024-12-04 RX ORDER — LOSARTAN POTASSIUM 25 MG/1
25 TABLET ORAL DAILY
Qty: 90 TABLET | Refills: 1 | Status: SHIPPED | OUTPATIENT
Start: 2024-12-04

## 2024-12-04 RX ORDER — BLOOD-GLUCOSE METER
EACH MISCELLANEOUS
Status: CANCELLED | OUTPATIENT
Start: 2024-12-04

## 2024-12-04 RX ORDER — BLOOD-GLUCOSE METER
1 EACH MISCELLANEOUS
Qty: 1 KIT | Refills: 0 | Status: SHIPPED | OUTPATIENT
Start: 2024-12-04

## 2024-12-04 NOTE — PROGRESS NOTES
Follow Up Office Visit      Date of Visit:  2024   Patient Name: Jyoti Gray  : 1950   MRN: [id number]     Chief Complaint:    Chief Complaint   Patient presents with    Diabetes     Follow up  Pt states new meter. Doesn't want dexcom etc.  Wants generic       History of Present Illness: Jyoti Gray is a 74 y.o. female who is here today for follow up.    History of Present Illness  Dm 2 put pof control. She got aic = 9.5 %, gout, htn, m.o.,       Subjective      Review of Systems:   Review of Systems    Past Medical History:   Past Medical History:   Diagnosis Date    Anxiety     Breast cancer     Chronic gout without tophus     Chronic kidney disease, stage 4 (severe)     Cobalamin deficiency     Essential hypertension     Fibromyalgia     GERD without esophagitis     Gout     High risk medication use     History of malignant neoplasm of breast     Hyperglycemia due to type 1 diabetes mellitus     Kidney failure     Lipoatrophic diabetes     Mixed hyperlipidemia     Obesity     Primary insomnia     Severe major depression     Severe obesity     Type 1 diabetes mellitus     Vitamin D deficiency        Past Surgical History:   Past Surgical History:   Procedure Laterality Date    ANKLE SURGERY Right     ARTERIOVENOUS FISTULA      BREAST LUMPECTOMY Right 2006    Malignant    COLONOSCOPY  2016    NORMAL- HP POLYPS - REPEAT 10 YEARS    COLONOSCOPY      NECK SURGERY      to remove port    SKIN CANCER EXCISION      arm and leg    TUBAL ABDOMINAL LIGATION         Family History:   Family History   Problem Relation Age of Onset    Diabetes Mother     Hyperlipidemia Mother     Hypertension Mother     Obesity Mother     Heart disease Mother     Heart attack Mother     Diabetes Father     Lung cancer Father     Stroke Sister     No Known Problems Sister     No Known Problems Sister     No Known Problems Sister     Cancer Brother     Lung cancer Brother     No Known Problems Son        Social  "History:   Social History     Socioeconomic History    Marital status:     Number of children: 1    Highest education level: 12th grade   Tobacco Use    Smoking status: Never     Passive exposure: Never    Smokeless tobacco: Never   Vaping Use    Vaping status: Never Used   Substance and Sexual Activity    Alcohol use: Never    Drug use: Never    Sexual activity: Defer       Medications:     Current Outpatient Medications:     Accu-Chek Softclix Lancets lancets, 1 each by Other route 3 times a day. 3 times a day, Disp: 100 each, Rfl: 3    allopurinol (ZYLOPRIM) 100 MG tablet, Take 1 tablet by mouth Daily., Disp: 90 tablet, Rfl: 3    atenolol (TENORMIN) 100 MG tablet, TAKE 1 TABLET BY MOUTH EVERY DAY, Disp: 90 tablet, Rfl: 1    atorvastatin (LIPITOR) 80 MG tablet, TAKE 1 TABLET BY MOUTH EVERY DAY, Disp: 90 tablet, Rfl: 1    FLUoxetine (PROzac) 40 MG capsule, Take 1 capsule by mouth Every Morning., Disp: 90 capsule, Rfl: 3    glucose blood test strip, 1 each by Other route 3 times a day. Use as instructed, Disp: 100 each, Rfl: 3    HumuLIN 70/30 (70-30) 100 UNIT/ML injection, INJECT 40 UNITS IN THE MORNING AND 33 UNITS AT NIGHT, Disp: 30 mL, Rfl: 0    hydrOXYzine (ATARAX) 25 MG tablet, Take 1 tablet by mouth 3 (Three) Times a Day As Needed for Itching., Disp: 90 tablet, Rfl: 1    Insulin Syringe 30G X 5/16\" 1 ML misc, Use 1 syringe Take As Directed., Disp: 100 each, Rfl: 1    losartan (COZAAR) 25 MG tablet, Take 1 tablet by mouth Daily., Disp: 90 tablet, Rfl: 1    torsemide (DEMADEX) 20 MG tablet, TAKE 1 TABLET BY MOUTH EVERY DAY, Disp: 90 tablet, Rfl: 1    Blood Glucose Monitoring Suppl (Accu-Chek Guide) w/Device kit, Use 1 each 5 (Five) Times a Day., Disp: 1 kit, Rfl: 0    Allergies:   No Known Allergies    Objective     Physical Exam:  Vital Signs:   Vitals:    12/04/24 1145   BP: 124/76   Pulse: 58   SpO2: 96%   Weight: 99.8 kg (220 lb)   Height: 157.5 cm (62\")     Body mass index is 40.24 kg/m². "     Physical Exam  Constitutional:       General: She is not in acute distress.     Appearance: Normal appearance. She is obese. She is not toxic-appearing or diaphoretic.   HENT:      Head: Normocephalic and atraumatic.      Right Ear: External ear normal.      Left Ear: External ear normal.      Nose: Nose normal.   Eyes:      Pupils: Pupils are equal, round, and reactive to light.   Skin:     General: Skin is warm and dry.   Neurological:      Mental Status: She is alert.   Psychiatric:         Mood and Affect: Mood normal.         Behavior: Behavior normal.       Physical Exam      Results      Procedures      Assessment / Plan      Assessment/Plan:   Diagnoses and all orders for this visit:    1. Type 2 diabetes mellitus with stage 3a chronic kidney disease, with long-term current use of insulin (Primary)  -     POC Glycosylated Hemoglobin (Hb A1C)  -     Hemoglobin A1c         Put back on losartan to protect her kidneys.  Raise 30 to 35 q am, 20 to 23 q pm  2. Essential hypertension  -     Comprehensive Metabolic Panel    3. Idiopathic chronic gout without tophus, unspecified site    4. Mixed hyperlipidemia  -     Lipid Panel    5. Other fatigue  -     CBC & Differential  -     TSH    6. Vitamin D deficiency  -     Vitamin D,25-Hydroxy    7. Cramp and spasm  -     Magnesium  8. Frozen bilat shoulders.  Other orders  -     Blood Glucose Monitoring Suppl (Accu-Chek Guide) w/Device kit; Use 1 each 5 (Five) Times a Day.  Dispense: 1 kit; Refill: 0  -     losartan (COZAAR) 25 MG tablet; Take 1 tablet by mouth Daily.  Dispense: 90 tablet; Refill: 1       Assessment & Plan      Follow Up:   Return in about 6 months (around 6/4/2025).    Patient or patient representative verbalized consent for the use of Ambient Listening during the visit with  Cooper Aaron MD for chart documentation. 12/4/2024  12:34 EST     @[email or web address] [place]

## 2024-12-05 LAB
25(OH)D3+25(OH)D2 SERPL-MCNC: 34.9 NG/ML (ref 30–100)
ALBUMIN SERPL-MCNC: 3.9 G/DL (ref 3.8–4.8)
ALP SERPL-CCNC: 109 IU/L (ref 44–121)
ALT SERPL-CCNC: 10 IU/L (ref 0–32)
AST SERPL-CCNC: 22 IU/L (ref 0–40)
BASOPHILS # BLD AUTO: NORMAL 10*3/UL
BILIRUB SERPL-MCNC: 0.8 MG/DL (ref 0–1.2)
BUN SERPL-MCNC: 29 MG/DL (ref 8–27)
BUN/CREAT SERPL: 19 (ref 12–28)
CALCIUM SERPL-MCNC: 9.5 MG/DL (ref 8.7–10.3)
CHLORIDE SERPL-SCNC: 102 MMOL/L (ref 96–106)
CHOLEST SERPL-MCNC: 106 MG/DL (ref 100–199)
CO2 SERPL-SCNC: 21 MMOL/L (ref 20–29)
CREAT SERPL-MCNC: 1.49 MG/DL (ref 0.57–1)
EGFRCR SERPLBLD CKD-EPI 2021: 37 ML/MIN/1.73
EOSINOPHIL # BLD AUTO: NORMAL 10*3/UL
EOSINOPHIL NFR BLD AUTO: NORMAL %
GLOBULIN SER CALC-MCNC: 3.2 G/DL (ref 1.5–4.5)
GLUCOSE SERPL-MCNC: 117 MG/DL (ref 70–99)
HBA1C MFR BLD: 10.4 % (ref 4.8–5.6)
HCT VFR BLD AUTO: NORMAL %
HDLC SERPL-MCNC: 48 MG/DL
HGB BLD-MCNC: NORMAL G/DL
LDLC SERPL CALC-MCNC: 37 MG/DL (ref 0–99)
LYMPHOCYTES # BLD AUTO: NORMAL 10*3/UL
LYMPHOCYTES NFR BLD AUTO: NORMAL %
MAGNESIUM SERPL-MCNC: 1.6 MG/DL (ref 1.6–2.3)
MONOCYTES NFR BLD AUTO: NORMAL %
NEUTROPHILS NFR BLD AUTO: NORMAL %
PLATELET # BLD AUTO: NORMAL 10*3/UL
POTASSIUM SERPL-SCNC: 3.7 MMOL/L (ref 3.5–5.2)
PROT SERPL-MCNC: 7.1 G/DL (ref 6–8.5)
RBC # BLD AUTO: NORMAL 10*6/UL
SODIUM SERPL-SCNC: 141 MMOL/L (ref 134–144)
SPECIMEN STATUS: NORMAL
TRIGL SERPL-MCNC: 114 MG/DL (ref 0–149)
TSH SERPL DL<=0.005 MIU/L-ACNC: 1.46 UIU/ML (ref 0.45–4.5)
VLDLC SERPL CALC-MCNC: 21 MG/DL (ref 5–40)
WBC # BLD AUTO: NORMAL X10E3/UL

## 2025-02-06 RX ORDER — FLUOXETINE 40 MG/1
40 CAPSULE ORAL EVERY MORNING
Qty: 90 CAPSULE | Refills: 1 | Status: SHIPPED | OUTPATIENT
Start: 2025-02-06

## 2025-02-07 RX ORDER — ALLOPURINOL 100 MG/1
100 TABLET ORAL DAILY
Qty: 90 TABLET | Refills: 3 | Status: SHIPPED | OUTPATIENT
Start: 2025-02-07

## 2025-03-10 RX ORDER — INSULIN HUMAN 100 [IU]/ML
INJECTION, SUSPENSION SUBCUTANEOUS
Qty: 30 ML | Refills: 1 | Status: SHIPPED | OUTPATIENT
Start: 2025-03-10

## 2025-03-17 RX ORDER — ATENOLOL 100 MG/1
100 TABLET ORAL DAILY
Qty: 90 TABLET | Refills: 0 | Status: SHIPPED | OUTPATIENT
Start: 2025-03-17

## 2025-05-12 RX ORDER — TORSEMIDE 20 MG/1
20 TABLET ORAL DAILY
Qty: 90 TABLET | Refills: 1 | Status: SHIPPED | OUTPATIENT
Start: 2025-05-12

## 2025-05-12 RX ORDER — ATORVASTATIN CALCIUM 80 MG/1
80 TABLET, FILM COATED ORAL DAILY
Qty: 90 TABLET | Refills: 1 | Status: SHIPPED | OUTPATIENT
Start: 2025-05-12

## 2025-06-02 RX ORDER — ATENOLOL 100 MG/1
100 TABLET ORAL DAILY
Qty: 90 TABLET | Refills: 0 | Status: SHIPPED | OUTPATIENT
Start: 2025-06-02

## 2025-06-04 NOTE — TELEPHONE ENCOUNTER
Hub relay: unable to leave voicemail for patient to call back and schedule an appointment with pcp.

## 2025-06-04 NOTE — TELEPHONE ENCOUNTER
2nd contact attempt.   Hub relay: unable to leave voicemail for patient to call back and schedule an appointment with pcp.      Sending letter.

## 2025-07-01 RX ORDER — BLOOD SUGAR DIAGNOSTIC
STRIP MISCELLANEOUS
Qty: 100 EACH | Refills: 3 | Status: SHIPPED | OUTPATIENT
Start: 2025-07-01

## 2025-08-26 RX ORDER — FLUOXETINE HYDROCHLORIDE 40 MG/1
40 CAPSULE ORAL EVERY MORNING
Qty: 90 CAPSULE | Refills: 0 | Status: SHIPPED | OUTPATIENT
Start: 2025-08-26